# Patient Record
Sex: MALE | Race: WHITE
[De-identification: names, ages, dates, MRNs, and addresses within clinical notes are randomized per-mention and may not be internally consistent; named-entity substitution may affect disease eponyms.]

---

## 2021-12-07 ENCOUNTER — HOSPITAL ENCOUNTER (INPATIENT)
Dept: HOSPITAL 95 - ER | Age: 56
LOS: 4 days | Discharge: HOME | DRG: 177 | End: 2021-12-11
Attending: HOSPITALIST | Admitting: HOSPITALIST
Payer: COMMERCIAL

## 2021-12-07 VITALS — HEIGHT: 76 IN | BODY MASS INDEX: 37.18 KG/M2 | WEIGHT: 305.34 LBS

## 2021-12-07 DIAGNOSIS — E66.9: ICD-10-CM

## 2021-12-07 DIAGNOSIS — I48.91: ICD-10-CM

## 2021-12-07 DIAGNOSIS — J96.01: ICD-10-CM

## 2021-12-07 DIAGNOSIS — N17.9: ICD-10-CM

## 2021-12-07 DIAGNOSIS — U07.1: Primary | ICD-10-CM

## 2021-12-07 DIAGNOSIS — E11.65: ICD-10-CM

## 2021-12-07 DIAGNOSIS — J12.82: ICD-10-CM

## 2021-12-07 DIAGNOSIS — I26.94: ICD-10-CM

## 2021-12-07 DIAGNOSIS — I10: ICD-10-CM

## 2021-12-07 DIAGNOSIS — Z79.899: ICD-10-CM

## 2021-12-07 LAB
PROTHROMBIN TIME: 12.6 SEC (ref 9.7–11.5)
TROPONIN I SERPL-MCNC: 0.02 NG/ML (ref 0–0.04)
TSH SERPL DL<=0.005 MIU/L-ACNC: 0.7 UIU/ML (ref 0.36–4.8)

## 2021-12-07 PROCEDURE — A9270 NON-COVERED ITEM OR SERVICE: HCPCS

## 2021-12-07 PROCEDURE — 3E0333Z INTRODUCTION OF ANTI-INFLAMMATORY INTO PERIPHERAL VEIN, PERCUTANEOUS APPROACH: ICD-10-PCS | Performed by: HOSPITALIST

## 2021-12-07 PROCEDURE — 8E0ZXY6 ISOLATION: ICD-10-PCS | Performed by: HOSPITALIST

## 2021-12-08 LAB
ALBUMIN SERPL BCP-MCNC: 2.5 G/DL (ref 3.4–5)
ALBUMIN/GLOB SERPL: 0.6 {RATIO} (ref 0.8–1.8)
ALT SERPL W P-5'-P-CCNC: 47 U/L (ref 12–78)
ANION GAP SERPL CALCULATED.4IONS-SCNC: 12 MMOL/L (ref 6–16)
AST SERPL W P-5'-P-CCNC: 17 U/L (ref 12–37)
BASOPHILS # BLD AUTO: 0.04 K/MM3 (ref 0–0.23)
BASOPHILS NFR BLD AUTO: 0 % (ref 0–2)
BILIRUB SERPL-MCNC: 1.7 MG/DL (ref 0.1–1)
BUN SERPL-MCNC: 17 MG/DL (ref 8–24)
CALCIUM SERPL-MCNC: 8.6 MG/DL (ref 8.5–10.1)
CHLORIDE SERPL-SCNC: 104 MMOL/L (ref 98–108)
CHOLEST SERPL-MCNC: 123 MG/DL (ref 50–200)
CHOLEST/HDLC SERPL: 4 {RATIO}
CO2 SERPL-SCNC: 21 MMOL/L (ref 21–32)
CREAT SERPL-MCNC: 1.03 MG/DL (ref 0.6–1.2)
DEPRECATED RDW RBC AUTO: 44.2 FL (ref 35.1–46.3)
EOSINOPHIL # BLD AUTO: 0 K/MM3 (ref 0–0.68)
EOSINOPHIL NFR BLD AUTO: 0 % (ref 0–6)
ERYTHROCYTE [DISTWIDTH] IN BLOOD BY AUTOMATED COUNT: 14.9 % (ref 11.7–14.2)
GLOBULIN SER CALC-MCNC: 4 G/DL (ref 2.2–4)
GLUCOSE SERPL-MCNC: 171 MG/DL (ref 70–99)
HCT VFR BLD AUTO: 50.1 % (ref 37–53)
HDLC SERPL-MCNC: 31 MG/DL (ref 39–?)
HGB BLD-MCNC: 16.5 G/DL (ref 13.5–17.5)
IMM GRANULOCYTES # BLD AUTO: 0.17 K/MM3 (ref 0–0.1)
IMM GRANULOCYTES NFR BLD AUTO: 1 % (ref 0–1)
LDLC SERPL CALC-MCNC: 81 MG/DL (ref 0–110)
LDLC/HDLC SERPL: 2.6 {RATIO}
LYMPHOCYTES # BLD AUTO: 1.05 K/MM3 (ref 0.84–5.2)
LYMPHOCYTES NFR BLD AUTO: 6 % (ref 21–46)
MCHC RBC AUTO-ENTMCNC: 32.9 G/DL (ref 31.5–36.5)
MCV RBC AUTO: 85 FL (ref 80–100)
MONOCYTES # BLD AUTO: 1.93 K/MM3 (ref 0.16–1.47)
MONOCYTES NFR BLD AUTO: 12 % (ref 4–13)
NEUTROPHILS # BLD AUTO: 13.49 K/MM3 (ref 1.96–9.15)
NEUTROPHILS NFR BLD AUTO: 81 % (ref 41–73)
NRBC # BLD AUTO: 0 K/MM3 (ref 0–0.02)
NRBC BLD AUTO-RTO: 0 /100 WBC (ref 0–0.2)
PLATELET # BLD AUTO: 376 K/MM3 (ref 150–400)
POTASSIUM SERPL-SCNC: 4.6 MMOL/L (ref 3.5–5.5)
PROT SERPL-MCNC: 6.5 G/DL (ref 6.4–8.2)
SODIUM SERPL-SCNC: 137 MMOL/L (ref 136–145)
TRIGL SERPL-MCNC: 56 MG/DL (ref 30–160)
VLDLC SERPL CALC-MCNC: 11 MG/DL (ref 6–32)

## 2021-12-08 NOTE — NUR
SHIFT SUMMARY
 
ER TRANSFER. ASSUMED CARE AT 0500. PT ALERT AND ORIENTED X4. ON RA MAINTAINING
SATS OVER 90%. AFIB 100, 110'S. CARDIZEM AND HEPARIN GTT INFUSING. +2 PITTING
LOWER EXTREMITY EDEMA. C/O CHEST TIGHTNESS. SOB W/ ACTIVITY. IN BED RESTING
WITH CALL ALARM AT SIDE

## 2021-12-08 NOTE — NUR
END OF SHIFT SUMMARY:
     FULL CODE, COVID, PE. PATIENT HAS A HISTORY OF HTN, BP'S HAVE BEEN WNL,
PATIENT DOES HAVE CHEST TIGHTNESS, HOWEVER, HAS NOT INCREASED, AND HAS BEEN
CONSISTENT. TROP -, LUNGS ARE DIM ON THE RLL BUT CLEAR AND COARSE ON THE REST.
PATINET HAS BEEN AFIB IN THE 'S CARDIZEM DRIP AT 5, HEPARIN DRIP AT 22,
47.5 ML/HR. INDEPENT IN THE ROOM O2 AT 2LPM SPO2 >93%. APPETITE APPROPRIATE.
ACTIVE IN REPOSITIONING THROUGHOUT THE DAY. EDEMA IN THE LOWER EXTREMS BILAT
+1-+2 DEPENDING ON HOW LONG SITTING, PEDAL PULSES FAINT, BUT PRESENT AND WARM.
HAS HAD ANXIETY PREVIOUSLY BUT WITH 2LPM NC O2, SUBSIDED.

## 2021-12-09 LAB
ANION GAP SERPL CALCULATED.4IONS-SCNC: 10 MMOL/L (ref 6–16)
BASOPHILS # BLD AUTO: 0.04 K/MM3 (ref 0–0.23)
BASOPHILS NFR BLD AUTO: 0 % (ref 0–2)
BUN SERPL-MCNC: 26 MG/DL (ref 8–24)
CALCIUM SERPL-MCNC: 8.9 MG/DL (ref 8.5–10.1)
CHLORIDE SERPL-SCNC: 104 MMOL/L (ref 98–108)
CO2 SERPL-SCNC: 21 MMOL/L (ref 21–32)
CREAT SERPL-MCNC: 1.06 MG/DL (ref 0.6–1.2)
DEPRECATED RDW RBC AUTO: 45.2 FL (ref 35.1–46.3)
EOSINOPHIL # BLD AUTO: 0 K/MM3 (ref 0–0.68)
EOSINOPHIL NFR BLD AUTO: 0 % (ref 0–6)
ERYTHROCYTE [DISTWIDTH] IN BLOOD BY AUTOMATED COUNT: 14.6 % (ref 11.7–14.2)
GLUCOSE SERPL-MCNC: 178 MG/DL (ref 70–99)
HCT VFR BLD AUTO: 49.8 % (ref 37–53)
HGB BLD-MCNC: 16.5 G/DL (ref 13.5–17.5)
IMM GRANULOCYTES # BLD AUTO: 0.16 K/MM3 (ref 0–0.1)
IMM GRANULOCYTES NFR BLD AUTO: 1 % (ref 0–1)
LYMPHOCYTES # BLD AUTO: 0.71 K/MM3 (ref 0.84–5.2)
LYMPHOCYTES NFR BLD AUTO: 5 % (ref 21–46)
MCHC RBC AUTO-ENTMCNC: 33.1 G/DL (ref 31.5–36.5)
MCV RBC AUTO: 85 FL (ref 80–100)
MONOCYTES # BLD AUTO: 1.62 K/MM3 (ref 0.16–1.47)
MONOCYTES NFR BLD AUTO: 11 % (ref 4–13)
NEUTROPHILS # BLD AUTO: 11.97 K/MM3 (ref 1.96–9.15)
NEUTROPHILS NFR BLD AUTO: 83 % (ref 41–73)
NRBC # BLD AUTO: 0 K/MM3 (ref 0–0.02)
NRBC BLD AUTO-RTO: 0 /100 WBC (ref 0–0.2)
PLATELET # BLD AUTO: 329 K/MM3 (ref 150–400)
POTASSIUM SERPL-SCNC: 5.2 MMOL/L (ref 3.5–5.5)
SODIUM SERPL-SCNC: 135 MMOL/L (ref 136–145)

## 2021-12-09 NOTE — NUR
Pt is A&Ox4, pleasant with cares. VSS on RA-2L. Pt has mostly been on RA
today. Tele: a-fib 90-110s, cardizem gtt continued at 5mg/hr and PO cardizem
was started. Isolation for COVID precautions were d/c today due to 21 days in
iso. Heparin gtt is still running and will be d/c 2100 once the first dose of
eliquis is given. pt is independent in room.

## 2021-12-09 NOTE — NUR
SHIFT SUMMARY
PT HAS BEEN UNABLE TO REST MUCH THROUGH THE NIGHT. HE IS CONSTANTLY SWITCHING
PLACES IN THE ROOM AND STATES HE CAN'T GET COMFORTABLE, HAS A LOT OF ANXIETY,
AND FEELS RESTLESS. PT EDUCATION ON PRONE POSITIONING & DB&C. ONE TIME DOSE OF
0.5 MG ATIVAN GIVEN, TYLENOL GIVEN FOR CHEST RELATED TO EXCESSIVE COUGHING,
HEP GTT INFUSING PER EMAR. CARDIZEM GTT REMIANS @ 5MG/HR, AFIB 90'S-100'S.
DENIES PAIN, ONLY STATES CHEST TIGHTNESS RELATED TO COUGHING AND CONGESTION.
pT INDEPENDENT IN THE ROOM. CALL LIGHT IN REACH, BED IN LOW POSITION.

## 2021-12-10 LAB
ALBUMIN SERPL BCP-MCNC: 2.5 G/DL (ref 3.4–5)
ANION GAP SERPL CALCULATED.4IONS-SCNC: 8 MMOL/L (ref 6–16)
BASOPHILS # BLD AUTO: 0.03 K/MM3 (ref 0–0.23)
BASOPHILS NFR BLD AUTO: 0 % (ref 0–2)
BUN SERPL-MCNC: 27 MG/DL (ref 8–24)
CALCIUM SERPL-MCNC: 9.1 MG/DL (ref 8.5–10.1)
CHLORIDE SERPL-SCNC: 101 MMOL/L (ref 98–108)
CO2 SERPL-SCNC: 26 MMOL/L (ref 21–32)
CREAT SERPL-MCNC: 1.27 MG/DL (ref 0.6–1.2)
DEPRECATED RDW RBC AUTO: 46 FL (ref 35.1–46.3)
EOSINOPHIL # BLD AUTO: 0.01 K/MM3 (ref 0–0.68)
EOSINOPHIL NFR BLD AUTO: 0 % (ref 0–6)
ERYTHROCYTE [DISTWIDTH] IN BLOOD BY AUTOMATED COUNT: 14.8 % (ref 11.7–14.2)
GLUCOSE SERPL-MCNC: 160 MG/DL (ref 70–99)
HCT VFR BLD AUTO: 48.8 % (ref 37–53)
HGB BLD-MCNC: 15.8 G/DL (ref 13.5–17.5)
IMM GRANULOCYTES # BLD AUTO: 0.17 K/MM3 (ref 0–0.1)
IMM GRANULOCYTES NFR BLD AUTO: 1 % (ref 0–1)
LYMPHOCYTES # BLD AUTO: 1 K/MM3 (ref 0.84–5.2)
LYMPHOCYTES NFR BLD AUTO: 7 % (ref 21–46)
MCHC RBC AUTO-ENTMCNC: 32.4 G/DL (ref 31.5–36.5)
MCV RBC AUTO: 86 FL (ref 80–100)
MONOCYTES # BLD AUTO: 1.51 K/MM3 (ref 0.16–1.47)
MONOCYTES NFR BLD AUTO: 10 % (ref 4–13)
NEUTROPHILS # BLD AUTO: 11.84 K/MM3 (ref 1.96–9.15)
NEUTROPHILS NFR BLD AUTO: 81 % (ref 41–73)
NRBC # BLD AUTO: 0 K/MM3 (ref 0–0.02)
NRBC BLD AUTO-RTO: 0 /100 WBC (ref 0–0.2)
PHOSPHATE SERPL-MCNC: 3.7 MG/DL (ref 2.5–4.9)
PLATELET # BLD AUTO: 309 K/MM3 (ref 150–400)
POTASSIUM SERPL-SCNC: 4.5 MMOL/L (ref 3.5–5.5)
SODIUM SERPL-SCNC: 135 MMOL/L (ref 136–145)

## 2021-12-10 PROCEDURE — XW033E5 INTRODUCTION OF REMDESIVIR ANTI-INFECTIVE INTO PERIPHERAL VEIN, PERCUTANEOUS APPROACH, NEW TECHNOLOGY GROUP 5: ICD-10-PCS | Performed by: HOSPITALIST

## 2021-12-10 NOTE — NUR
PATIENT IS ALERT AND ORIENTATED USES CALL LIGHT APPROPRIATELY, ABLE TO MOVE
FREELY IN ROOM INDEPENDENTLY WITH BATHROOM PRIVILAGES, PATIENT RECEIVED LASIX
IV TONIGHT AND HAS REPORTED HAVING TO URINATE ABOUT EVERY 20 MINUTES, LUNG
SOUNDS ARE COARSE THROUGHOUT ALL FIELDS, USES INCENTIVE SPIROMETER THROUGHOUT
SHIFT, NO NOTED COUGH REPORTS WHEN HE DOES COUGH THAT HE HAS THINNER
SECRETIONS WITH SLIGHT TINGE OF PINK. HE IS AFIB ON TELEMETRY WITH RATES
MAINTAINING IN THE 80'S NO COMPLAINTS OF PAIN, ON RA HASN'T USED HIS OXYGEN
THIS EVENING, WILL CONTINUE TO MONITOR AND WILL CHART ANY KNOWN CHANGES.

## 2021-12-10 NOTE — NUR
PATIENT HAD A DBP , PATIENT REPORTED ANXIOUS AND HAVING DIFFICULTY
SLEEPING RECEIVED ATIVAN 1MG PO. SNACK PROVIDED PER REQUEST AND WILL
REEVALUATE BLOOD PRESSURE.

## 2021-12-10 NOTE — NUR
SHIFT SUMMARY
PTR A/O X4 AND COOPERATIVE OF CARE. VSS T/O SHIFT WITH O2 SATS >94% ON RA. PT
INDEPENDENT IN ROOM. NO C/O CHEST PAIN/PRESSURE T/O SHIFT. NO REPORT OF SOB
T/O SHIFT. PT REPORTS "I GOT THE BEST SLEEP IN A MONTH LAST NIGHT." PT ASKED
ABOUT DISCHARGE, DOCTOR INFORMED PT THAT AN 02 EVAL WHILE AMBULATING WILL NEED
TO BE DONE TOMORROW, PT UNDERSTOOD. PT REORTS BEING ABLE TO BREATH EASIER
DURING ASSESSMENT.

## 2021-12-11 LAB
ALBUMIN SERPL BCP-MCNC: 2.3 G/DL (ref 3.4–5)
ANION GAP SERPL CALCULATED.4IONS-SCNC: 7 MMOL/L (ref 6–16)
BASOPHILS # BLD AUTO: 0.03 K/MM3 (ref 0–0.23)
BASOPHILS NFR BLD AUTO: 0 % (ref 0–2)
BUN SERPL-MCNC: 29 MG/DL (ref 8–24)
CALCIUM SERPL-MCNC: 9 MG/DL (ref 8.5–10.1)
CHLORIDE SERPL-SCNC: 102 MMOL/L (ref 98–108)
CO2 SERPL-SCNC: 27 MMOL/L (ref 21–32)
CREAT SERPL-MCNC: 1.35 MG/DL (ref 0.6–1.2)
DEPRECATED RDW RBC AUTO: 46 FL (ref 35.1–46.3)
EOSINOPHIL # BLD AUTO: 0 K/MM3 (ref 0–0.68)
EOSINOPHIL NFR BLD AUTO: 0 % (ref 0–6)
ERYTHROCYTE [DISTWIDTH] IN BLOOD BY AUTOMATED COUNT: 14.6 % (ref 11.7–14.2)
GLUCOSE SERPL-MCNC: 204 MG/DL (ref 70–99)
HCT VFR BLD AUTO: 49.1 % (ref 37–53)
HGB BLD-MCNC: 15.8 G/DL (ref 13.5–17.5)
IMM GRANULOCYTES # BLD AUTO: 0.21 K/MM3 (ref 0–0.1)
IMM GRANULOCYTES NFR BLD AUTO: 2 % (ref 0–1)
LYMPHOCYTES # BLD AUTO: 0.76 K/MM3 (ref 0.84–5.2)
LYMPHOCYTES NFR BLD AUTO: 6 % (ref 21–46)
MAGNESIUM SERPL-MCNC: 2 MG/DL (ref 1.6–2.4)
MCHC RBC AUTO-ENTMCNC: 32.2 G/DL (ref 31.5–36.5)
MCV RBC AUTO: 87 FL (ref 80–100)
MONOCYTES # BLD AUTO: 1.29 K/MM3 (ref 0.16–1.47)
MONOCYTES NFR BLD AUTO: 11 % (ref 4–13)
NEUTROPHILS # BLD AUTO: 9.94 K/MM3 (ref 1.96–9.15)
NEUTROPHILS NFR BLD AUTO: 81 % (ref 41–73)
NRBC # BLD AUTO: 0 K/MM3 (ref 0–0.02)
NRBC BLD AUTO-RTO: 0 /100 WBC (ref 0–0.2)
PHOSPHATE SERPL-MCNC: 3.9 MG/DL (ref 2.5–4.9)
PLATELET # BLD AUTO: 281 K/MM3 (ref 150–400)
POTASSIUM SERPL-SCNC: 4.5 MMOL/L (ref 3.5–5.5)
SODIUM SERPL-SCNC: 136 MMOL/L (ref 136–145)

## 2021-12-11 NOTE — NUR
PT DISCHARGE
PT PROVIDED WITH DISCHARGE INSTRUCTIOSN PER [facility]. PT EDUCATED ON NEW
DIABETIC DX. INSTRUCTED ON HOW TO USE GLUCOMETER. USED TEACH BACK METHOD.PT
HAS HARD COPY RX FOR GLUCOMETER. MEDICATIONS FAXED TO PT'S PREFERRED PHARMACY.
IV'S REMOVED. TELE REMOVED. PT LEFT BY SELF TO PRIVATE VEHICLE.

## 2021-12-28 ENCOUNTER — HOSPITAL ENCOUNTER (INPATIENT)
Dept: HOSPITAL 95 - ER | Age: 56
LOS: 32 days | DRG: 4 | End: 2022-01-29
Attending: HOSPITALIST | Admitting: HOSPITALIST
Payer: COMMERCIAL

## 2021-12-28 VITALS — WEIGHT: 272.27 LBS | HEIGHT: 76 IN | BODY MASS INDEX: 33.16 KG/M2

## 2021-12-28 DIAGNOSIS — A41.89: Primary | ICD-10-CM

## 2021-12-28 DIAGNOSIS — I48.92: ICD-10-CM

## 2021-12-28 DIAGNOSIS — E66.9: ICD-10-CM

## 2021-12-28 DIAGNOSIS — E83.42: ICD-10-CM

## 2021-12-28 DIAGNOSIS — L89.153: ICD-10-CM

## 2021-12-28 DIAGNOSIS — R57.0: ICD-10-CM

## 2021-12-28 DIAGNOSIS — E87.5: ICD-10-CM

## 2021-12-28 DIAGNOSIS — I48.91: ICD-10-CM

## 2021-12-28 DIAGNOSIS — G92.8: ICD-10-CM

## 2021-12-28 DIAGNOSIS — J96.01: ICD-10-CM

## 2021-12-28 DIAGNOSIS — E11.22: ICD-10-CM

## 2021-12-28 DIAGNOSIS — D64.9: ICD-10-CM

## 2021-12-28 DIAGNOSIS — J12.82: ICD-10-CM

## 2021-12-28 DIAGNOSIS — E87.2: ICD-10-CM

## 2021-12-28 DIAGNOSIS — Z51.5: ICD-10-CM

## 2021-12-28 DIAGNOSIS — K31.82: ICD-10-CM

## 2021-12-28 DIAGNOSIS — K74.60: ICD-10-CM

## 2021-12-28 DIAGNOSIS — R65.21: ICD-10-CM

## 2021-12-28 DIAGNOSIS — E83.39: ICD-10-CM

## 2021-12-28 DIAGNOSIS — Z66: ICD-10-CM

## 2021-12-28 DIAGNOSIS — Z79.899: ICD-10-CM

## 2021-12-28 DIAGNOSIS — N17.9: ICD-10-CM

## 2021-12-28 DIAGNOSIS — E11.65: ICD-10-CM

## 2021-12-28 DIAGNOSIS — E80.6: ICD-10-CM

## 2021-12-28 DIAGNOSIS — K72.01: ICD-10-CM

## 2021-12-28 DIAGNOSIS — N18.6: ICD-10-CM

## 2021-12-28 DIAGNOSIS — Z79.84: ICD-10-CM

## 2021-12-28 DIAGNOSIS — I10: ICD-10-CM

## 2021-12-28 DIAGNOSIS — U07.1: ICD-10-CM

## 2021-12-28 DIAGNOSIS — E11.43: ICD-10-CM

## 2021-12-28 DIAGNOSIS — E87.1: ICD-10-CM

## 2021-12-28 DIAGNOSIS — I42.0: ICD-10-CM

## 2021-12-28 DIAGNOSIS — R04.0: ICD-10-CM

## 2021-12-28 DIAGNOSIS — N15.1: ICD-10-CM

## 2021-12-28 DIAGNOSIS — E88.09: ICD-10-CM

## 2021-12-28 DIAGNOSIS — E83.51: ICD-10-CM

## 2021-12-28 DIAGNOSIS — K59.00: ICD-10-CM

## 2021-12-28 DIAGNOSIS — K31.84: ICD-10-CM

## 2021-12-28 DIAGNOSIS — J15.9: ICD-10-CM

## 2021-12-28 DIAGNOSIS — E11.42: ICD-10-CM

## 2021-12-28 LAB
ALBUMIN SERPL BCP-MCNC: 2 G/DL (ref 3.4–5)
ALBUMIN/GLOB SERPL: 0.5 {RATIO} (ref 0.8–1.8)
ALT SERPL W P-5'-P-CCNC: 946 U/L (ref 12–78)
ANION GAP SERPL CALCULATED.4IONS-SCNC: 15 MMOL/L (ref 6–16)
ANION GAP SERPL CALCULATED.4IONS-SCNC: 23 MMOL/L (ref 6–16)
ANION GAP SERPL CALCULATED.4IONS-SCNC: 29 MMOL/L (ref 6–16)
AST SERPL W P-5'-P-CCNC: 1069 U/L (ref 12–37)
BASOPHILS # BLD AUTO: 0.05 K/MM3 (ref 0–0.23)
BASOPHILS # BLD AUTO: 0.08 K/MM3 (ref 0–0.23)
BASOPHILS NFR BLD AUTO: 0 % (ref 0–2)
BASOPHILS NFR BLD AUTO: 1 % (ref 0–2)
BILIRUB SERPL-MCNC: 3.6 MG/DL (ref 0.1–1)
BILIRUB UR QL STRIP: (no result)
BUN SERPL-MCNC: 34 MG/DL (ref 8–24)
BUN SERPL-MCNC: 37 MG/DL (ref 8–24)
BUN SERPL-MCNC: 43 MG/DL (ref 8–24)
CALCIUM SERPL-MCNC: 6.6 MG/DL (ref 8.5–10.1)
CALCIUM SERPL-MCNC: 8.7 MG/DL (ref 8.5–10.1)
CHLORIDE SERPL-SCNC: 104 MMOL/L (ref 98–108)
CHLORIDE SERPL-SCNC: 96 MMOL/L (ref 98–108)
CHLORIDE SERPL-SCNC: 97 MMOL/L (ref 98–108)
CO2 SERPL-SCNC: 17 MMOL/L (ref 21–32)
CO2 SERPL-SCNC: 7 MMOL/L (ref 21–32)
CO2 SERPL-SCNC: 7 MMOL/L (ref 21–32)
CREAT SERPL-MCNC: 1.68 MG/DL (ref 0.6–1.2)
CREAT SERPL-MCNC: 1.96 MG/DL (ref 0.6–1.2)
CREAT SERPL-MCNC: 2.9 MG/DL (ref 0.6–1.2)
DEPRECATED RDW RBC AUTO: 47.1 FL (ref 35.1–46.3)
DEPRECATED RDW RBC AUTO: 49.3 FL (ref 35.1–46.3)
EOSINOPHIL # BLD AUTO: 0.01 K/MM3 (ref 0–0.68)
EOSINOPHIL # BLD AUTO: 0.02 K/MM3 (ref 0–0.68)
EOSINOPHIL NFR BLD AUTO: 0 % (ref 0–6)
EOSINOPHIL NFR BLD AUTO: 0 % (ref 0–6)
ERYTHROCYTE [DISTWIDTH] IN BLOOD BY AUTOMATED COUNT: 15.1 % (ref 11.7–14.2)
ERYTHROCYTE [DISTWIDTH] IN BLOOD BY AUTOMATED COUNT: 15.2 % (ref 11.7–14.2)
FLUAV RNA SPEC QL NAA+PROBE: NEGATIVE
FLUBV RNA SPEC QL NAA+PROBE: NEGATIVE
GLOBULIN SER CALC-MCNC: 3.7 G/DL (ref 2.2–4)
GLUCOSE SERPL-MCNC: 104 MG/DL (ref 70–99)
GLUCOSE SERPL-MCNC: 114 MG/DL (ref 70–99)
GLUCOSE SERPL-MCNC: 155 MG/DL (ref 70–99)
HCT VFR BLD AUTO: 41.6 % (ref 37–53)
HCT VFR BLD AUTO: 46 % (ref 37–53)
HGB BLD-MCNC: 13 G/DL (ref 13.5–17.5)
HGB BLD-MCNC: 15.1 G/DL (ref 13.5–17.5)
IMM GRANULOCYTES # BLD AUTO: 0.18 K/MM3 (ref 0–0.1)
IMM GRANULOCYTES # BLD AUTO: 0.63 K/MM3 (ref 0–0.1)
IMM GRANULOCYTES NFR BLD AUTO: 1 % (ref 0–1)
IMM GRANULOCYTES NFR BLD AUTO: 5 % (ref 0–1)
KETONES UR STRIP-MCNC: (no result) MG/DL
LEUKOCYTE ESTERASE UR QL STRIP: (no result)
LYMPHOCYTES # BLD AUTO: 0.73 K/MM3 (ref 0.84–5.2)
LYMPHOCYTES # BLD AUTO: 0.98 K/MM3 (ref 0.84–5.2)
LYMPHOCYTES NFR BLD AUTO: 5 % (ref 21–46)
LYMPHOCYTES NFR BLD AUTO: 7 % (ref 21–46)
MAGNESIUM SERPL-MCNC: 1.4 MG/DL (ref 1.6–2.4)
MCHC RBC AUTO-ENTMCNC: 31.3 G/DL (ref 31.5–36.5)
MCHC RBC AUTO-ENTMCNC: 32.8 G/DL (ref 31.5–36.5)
MCV RBC AUTO: 85 FL (ref 80–100)
MCV RBC AUTO: 90 FL (ref 80–100)
MONOCYTES # BLD AUTO: 1.17 K/MM3 (ref 0.16–1.47)
MONOCYTES # BLD AUTO: 1.26 K/MM3 (ref 0.16–1.47)
MONOCYTES NFR BLD AUTO: 9 % (ref 4–13)
MONOCYTES NFR BLD AUTO: 9 % (ref 4–13)
NEUTROPHILS # BLD AUTO: 10.32 K/MM3 (ref 1.96–9.15)
NEUTROPHILS # BLD AUTO: 12.24 K/MM3 (ref 1.96–9.15)
NEUTROPHILS NFR BLD AUTO: 78 % (ref 41–73)
NEUTROPHILS NFR BLD AUTO: 85 % (ref 41–73)
NRBC # BLD AUTO: 0 K/MM3 (ref 0–0.02)
NRBC # BLD AUTO: 0.02 K/MM3 (ref 0–0.02)
NRBC BLD AUTO-RTO: 0 /100 WBC (ref 0–0.2)
NRBC BLD AUTO-RTO: 0.2 /100 WBC (ref 0–0.2)
PH BLDA: 7.07 [PH] (ref 7.35–7.45)
PH BLDA: 7.11 [PH] (ref 7.35–7.45)
PH BLDA: 7.12 [PH] (ref 7.35–7.45)
PH BLDA: 7.47 [PH] (ref 7.35–7.45)
PLATELET # BLD AUTO: 206 K/MM3 (ref 150–400)
PLATELET # BLD AUTO: 254 K/MM3 (ref 150–400)
POTASSIUM SERPL-SCNC: 5 MMOL/L (ref 3.5–5.5)
POTASSIUM SERPL-SCNC: 5.6 MMOL/L (ref 3.5–5.5)
POTASSIUM SERPL-SCNC: 7 MMOL/L (ref 3.5–5.5)
PROT SERPL-MCNC: 5.7 G/DL (ref 6.4–8.2)
PROT UR STRIP-MCNC: (no result) MG/DL
RSV RNA SPEC QL NAA+PROBE: NEGATIVE
SARS-COV-2 RNA RESP QL NAA+PROBE: POSITIVE
SODIUM SERPL-SCNC: 129 MMOL/L (ref 136–145)
SODIUM SERPL-SCNC: 132 MMOL/L (ref 136–145)
SODIUM SERPL-SCNC: 134 MMOL/L (ref 136–145)
SP GR SPEC: 1.03 (ref 1–1.02)
TROPONIN I SERPL-MCNC: 0.03 NG/ML (ref 0–0.04)
TROPONIN I SERPL-MCNC: 0.07 NG/ML (ref 0–0.04)
UROBILINOGEN UR STRIP-MCNC: (no result) MG/DL
WBC #/AREA URNS HPF: (no result) /HPF (ref 0–5)

## 2021-12-28 PROCEDURE — 3E0333Z INTRODUCTION OF ANTI-INFLAMMATORY INTO PERIPHERAL VEIN, PERCUTANEOUS APPROACH: ICD-10-PCS | Performed by: HOSPITALIST

## 2021-12-28 PROCEDURE — 5A09357 ASSISTANCE WITH RESPIRATORY VENTILATION, LESS THAN 24 CONSECUTIVE HOURS, CONTINUOUS POSITIVE AIRWAY PRESSURE: ICD-10-PCS | Performed by: HOSPITALIST

## 2021-12-28 PROCEDURE — 03HY32Z INSERTION OF MONITORING DEVICE INTO UPPER ARTERY, PERCUTANEOUS APPROACH: ICD-10-PCS | Performed by: INTERNAL MEDICINE

## 2021-12-28 PROCEDURE — P9046 ALBUMIN (HUMAN), 25%, 20 ML: HCPCS

## 2021-12-28 PROCEDURE — 0BH18EZ INSERTION OF ENDOTRACHEAL AIRWAY INTO TRACHEA, VIA NATURAL OR ARTIFICIAL OPENING ENDOSCOPIC: ICD-10-PCS | Performed by: INTERNAL MEDICINE

## 2021-12-28 PROCEDURE — 3E033XZ INTRODUCTION OF VASOPRESSOR INTO PERIPHERAL VEIN, PERCUTANEOUS APPROACH: ICD-10-PCS | Performed by: HOSPITALIST

## 2021-12-28 PROCEDURE — C1751 CATH, INF, PER/CENT/MIDLINE: HCPCS

## 2021-12-28 PROCEDURE — C1752 CATH,HEMODIALYSIS,SHORT-TERM: HCPCS

## 2021-12-28 PROCEDURE — P9059 PLASMA, FRZ BETWEEN 8-24HOUR: HCPCS

## 2021-12-28 PROCEDURE — 4A133B1 MONITORING OF ARTERIAL PRESSURE, PERIPHERAL, PERCUTANEOUS APPROACH: ICD-10-PCS | Performed by: INTERNAL MEDICINE

## 2021-12-28 PROCEDURE — 06HM33Z INSERTION OF INFUSION DEVICE INTO RIGHT FEMORAL VEIN, PERCUTANEOUS APPROACH: ICD-10-PCS | Performed by: INTERNAL MEDICINE

## 2021-12-28 PROCEDURE — C1750 CATH, HEMODIALYSIS,LONG-TERM: HCPCS

## 2021-12-28 PROCEDURE — C9113 INJ PANTOPRAZOLE SODIUM, VIA: HCPCS

## 2021-12-28 PROCEDURE — 3E03329 INTRODUCTION OF OTHER ANTI-INFECTIVE INTO PERIPHERAL VEIN, PERCUTANEOUS APPROACH: ICD-10-PCS | Performed by: HOSPITALIST

## 2021-12-28 PROCEDURE — 4A133J1 MONITORING OF ARTERIAL PULSE, PERIPHERAL, PERCUTANEOUS APPROACH: ICD-10-PCS | Performed by: INTERNAL MEDICINE

## 2021-12-28 PROCEDURE — 5A1955Z RESPIRATORY VENTILATION, GREATER THAN 96 CONSECUTIVE HOURS: ICD-10-PCS | Performed by: HOSPITALIST

## 2021-12-28 PROCEDURE — A9270 NON-COVERED ITEM OR SERVICE: HCPCS

## 2021-12-28 NOTE — NUR
Pt arrived to PCU, labored breathing, RR 44/ min on bipap V60 settings 12/10
and 80% fio2.  He is anxious, wanting to take of the bipap and saying, "I
can't take it anymore".  RN from ED and RT at bedside, as well as accepting
PCU RN.  Cyanosis and mottling noted of feet and lower legs, dusky face and
cold, clammy skin.  ICU charge RN Rodriguez called to come see the pt.  Decision
made to transfer pt to ICU.

## 2021-12-28 NOTE — NUR
INTUBATION
 
PATIENT BECAME INCREASINGLY TACHYPNEIC W/ RR IN HIGH 40'S W/ INCREASED WOB AND
DECREASED SPO2 IN 80'S. DURING THE PROCEDURE THE PATIENT WAS SEDATED W/ A
TOTAL  MCG OF PROPOFOL AND BECAME MORE HYPOTENSIVE W/ MAPS IN 40'S
DESPITE DOBUTAMINE AND LEVOPHED GTT. PHENYLEPHRINE  MCG X 3 GIVEN FOR
A TOTAL  MCG GIVEN DURING PROCEDURE ALONG WITH 500ML NS BOLUS. PATIENT
SUBSEQUENTLY STARTED ON VASOPRESSIN AND PHENYLEPHRINE GTTS. SEE OPERATIVE
NOTES FOR FURTHER INFORMATION ON INTUBATION.

## 2021-12-28 NOTE — NUR
ASSUMED CARE/ARRIVED TO ICU
 
PATIENT ARRIVED TO ICU ON BIPAP W/ LABORED BREATHING AND SPO2 IN LOW 80'S
DESPITE 100% FIO2. PATIENT SPEAKS 2-3 WORDS BETWEEN BREATHS AND RR 40'S.
PATIENT IS HYPOTENSIVE W/ MAPS IN LOW 50'S. SKIN IS OVERALL PALE AND
DIAPHORETIC/CLAMMY. PULSES IN SAKINA RADIAL AND SAKINA DP/PT BY DOPPLER ONLY.
PATIENT IS INCREASINGLY CONFUSED AND UNABLE TO ANSWER QUESTIONS APPROPRIATELY.
PULLS AT LINES AND MOVES FREQUENTLY IN BED. TEMP KRAFT PLACED AT BEDSIDE
DURING ASSESSMENT. BEDSIDE REPORT COMPLETED W/ PCU RN.

## 2021-12-29 LAB
ALBUMIN SERPL BCP-MCNC: 1.9 G/DL (ref 3.4–5)
ALBUMIN/GLOB SERPL: 0.5 {RATIO} (ref 0.8–1.8)
ANION GAP SERPL CALCULATED.4IONS-SCNC: 17 MMOL/L (ref 6–16)
ANION GAP SERPL CALCULATED.4IONS-SCNC: 20 MMOL/L (ref 6–16)
ANION GAP SERPL CALCULATED.4IONS-SCNC: 24 MMOL/L (ref 6–16)
AST SERPL W P-5'-P-CCNC: 7228 U/L (ref 12–37)
BASOPHILS # BLD: 0 K/MM3 (ref 0–0.23)
BASOPHILS NFR BLD: 0 % (ref 0–2)
BILIRUB SERPL-MCNC: 4.7 MG/DL (ref 0.1–1)
BUN SERPL-MCNC: 46 MG/DL (ref 8–24)
BUN SERPL-MCNC: 49 MG/DL (ref 8–24)
BUN SERPL-MCNC: 52 MG/DL (ref 8–24)
CALCIUM SERPL-MCNC: 6.6 MG/DL (ref 8.5–10.1)
CALCIUM SERPL-MCNC: 7.2 MG/DL (ref 8.5–10.1)
CALCIUM SERPL-MCNC: 8.4 MG/DL (ref 8.5–10.1)
CALCIUM SERPL-MCNC: 8.8 MG/DL (ref 8.5–10.1)
CHLORIDE SERPL-SCNC: 91 MMOL/L (ref 98–108)
CHLORIDE SERPL-SCNC: 92 MMOL/L (ref 98–108)
CHLORIDE SERPL-SCNC: 94 MMOL/L (ref 98–108)
CO2 SERPL-SCNC: 10 MMOL/L (ref 21–32)
CO2 SERPL-SCNC: 15 MMOL/L (ref 21–32)
CO2 SERPL-SCNC: 16 MMOL/L (ref 21–32)
CREAT SERPL-MCNC: 3.29 MG/DL (ref 0.6–1.2)
CREAT SERPL-MCNC: 3.87 MG/DL (ref 0.6–1.2)
CREAT SERPL-MCNC: 4.23 MG/DL (ref 0.6–1.2)
DEPRECATED RDW RBC AUTO: 50.4 FL (ref 35.1–46.3)
EOSINOPHIL # BLD: 0 K/MM3 (ref 0–0.68)
EOSINOPHIL NFR BLD: 0 % (ref 0–6)
ERYTHROCYTE [DISTWIDTH] IN BLOOD BY AUTOMATED COUNT: 15.3 % (ref 11.7–14.2)
GLOBULIN SER CALC-MCNC: 3.5 G/DL (ref 2.2–4)
GLUCOSE SERPL-MCNC: 281 MG/DL (ref 70–99)
GLUCOSE SERPL-MCNC: 365 MG/DL (ref 70–99)
GLUCOSE SERPL-MCNC: 423 MG/DL (ref 70–99)
HCT VFR BLD AUTO: 41.9 % (ref 37–53)
HGB BLD-MCNC: 13.1 G/DL (ref 13.5–17.5)
LYMPHOCYTES # BLD: 0.63 K/MM3 (ref 0.84–5.2)
LYMPHOCYTES NFR BLD: 4 % (ref 21–46)
MAGNESIUM SERPL-MCNC: 2.1 MG/DL (ref 1.6–2.4)
MCHC RBC AUTO-ENTMCNC: 31.3 G/DL (ref 31.5–36.5)
MCV RBC AUTO: 90 FL (ref 80–100)
MONOCYTES # BLD: 1.42 K/MM3 (ref 0.16–1.47)
MONOCYTES NFR BLD: 9 % (ref 4–13)
MYELOCYTES # BLD MANUAL: 0.15 K/MM3 (ref 0–0)
MYELOCYTES NFR BLD MANUAL: 1 % (ref 0–0)
NEUTS BAND NFR BLD MANUAL: 12 % (ref 0–8)
NEUTS SEG # BLD MANUAL: 13.61 K/MM3 (ref 1.96–9.15)
NEUTS SEG NFR BLD MANUAL: 74 % (ref 41–73)
NRBC # BLD AUTO: 0.03 K/MM3 (ref 0–0.02)
NRBC BLD AUTO-RTO: 0.2 /100 WBC (ref 0–0.2)
PH BLDA: 7.21 [PH] (ref 7.35–7.45)
PH BLDA: 7.36 [PH] (ref 7.35–7.45)
PH BLDA: 7.47 [PH] (ref 7.35–7.45)
PHOSPHATE SERPL-MCNC: 8.5 MG/DL (ref 2.5–4.9)
PLATELET # BLD AUTO: 191 K/MM3 (ref 150–400)
POTASSIUM SERPL-SCNC: 5 MMOL/L (ref 3.5–5.5)
POTASSIUM SERPL-SCNC: 5.7 MMOL/L (ref 3.5–5.5)
POTASSIUM SERPL-SCNC: 6.9 MMOL/L (ref 3.5–5.5)
PROT SERPL-MCNC: 5.4 G/DL (ref 6.4–8.2)
PROTHROMBIN TIME: 30.2 SEC (ref 9.7–11.5)
SODIUM SERPL-SCNC: 125 MMOL/L (ref 136–145)
SODIUM SERPL-SCNC: 126 MMOL/L (ref 136–145)
SODIUM SERPL-SCNC: 128 MMOL/L (ref 136–145)
TOTAL CELLS COUNTED BLD: 100
TROPONIN I SERPL-MCNC: 0.13 NG/ML (ref 0–0.04)

## 2021-12-29 NOTE — NUR
ASSUMED CARE @0700
PATIENT INTUBATED AND SEDATED ON PROPOFOL, PRECEDEX AND ATIVAN PRN. PATIENT
MOVING IN BED COUGHING AT START OF SHIFT, MEDICATED WITH ATIVAN. RESPONDS TO
PAINFUL STIMULI. 02 SATS 95% ON VENT AC PC 30/8/50%, RR 30. LUNGS CLEAR TO
DIMINISHED IN THE BASES. SCANT AMOUNT OF BLOODY SPUTUM IN SUCTION TUBE. HR A.
FIB @90s-130s. BP STABLE WITH MULTIPLE PRESSORS LEVO, DOPAMINE, DOBUTAMINE,
VASO, AND FAISAL. STARTED EPI AND ABLE TO TITRATE DOPAMINE, DOBUTAMINE, AND FAISAL
OFF. ART LINE IN PLACE. TOES PURPLE/BLUE, CAP REFILL >3 SECONDS. DOPPLER
PULSES ON BLE. OG TUBE TO LIS, SMALL AMOUNT OF BROWN BILE. BOWEL TONES ABSENT.
KRAFT WITH SMALL AMOUNT OF DEANA OUTPUTT ONE TIME ORDER FOR LASIX, SODIUM
BICARB STOPPED PER DR. WAYNE. , INSULIN GTT ORDERED, WILL START. TURNED
Q2 HOURS. SPOKE WITH DAUGHTER LUIS AND UPDATED ON PT CONDITION, SHE WILL
UPDATE FAMILY. SEE SHIFT ASSESSMENT FOR MORE DETAIL.

## 2021-12-29 NOTE — NUR
SOME SEDIMENT NOTICED IN KRAFT CATHETER, IRRIGATED WITH 100 MLS STERILE WATER,
STILL MINIMAL OUTPUT BUT KRAFT PATENT.

## 2021-12-29 NOTE — NUR
VALUABLES
 
PATIENT HAS CELL PHONE, GLASSES AND WALLET IN ROOM. FAMILY DECLINED TO TAKE
BELONGINGS HOME. WALLET HAS CASH INSIDE AMOUNTING TO 88 DOLLARS, COUNTED WITH
REESE GERMAIN. THESE BELONGINGS ARE PLACED IN A ZIPLOCK BAG, THEN PLACED IN WHITE
BELONGINGS BAG WITH CLOTHING. WILL AGAIN ENCOURAGE FAMILY TO TAKE THESE
BELONGINGS HOME.

## 2021-12-29 NOTE — NUR
SHIFT SUMMARY
 
PATIENT CAME TO ICU ON BIPAP WITH 100% FIO2, INTUBATED AFTER ARRIVING TO ICU.
CL AND ART LINE PLACED TO RT FEMORAL. BLOOD PRESSURES REMAINED LOW; STARTED ON
LEVOPHED, DOBUTAMINE, VASOPRESSIN, PHENYLEPHRINE, AND DOPAMINE TO KEEP MAPS 65
OR GREATER; MAPS CURRENTLY HIGH 50'S. PROPOFOL AND PRECEDEX IS INF FOR
SEDATION. SODIUM BICARB INF. SEE FLOW SHEET FOR GTT RATES. PATIENT IS ON AC/PC
PI 25, PEEP 8, RATE 30, FIO2 50%. RR IN 30'S, SPO2 IN LOW 90'S. PATIENT BITES
ON ETT PERIODICALLY AND REQUIRES PRN ATIVAN PUSHES TO PROMOTE VENTILATOR
COMPLIANCE. OGT PLACED AND PLACED TO INTERMITTENT SUCTION. TEMP KRAFT PATENT
AND DRAINED 100ML DEANA CLEAR URINE. TEMP INCREASED FROM 100 .3F.
 
 
FAMILY CAME TO BEDSIDE AND UPDATES WERE GIVEN ON PATIENT CONDITION AND
PROGNOSIS. FAMILY WAS ENCOURAGED TO TAKE HOME PATIENT BELONGINGS, BUT
DECLINED. SEE NOTE "VALUABLES".

## 2021-12-29 NOTE — NUR
ASSUMED CARE
 
PATIENT LYING IN BED INTUBATED AND SEDATED. CURRENTLY ON PC 22/5 30% FIO2 WITH
SPO2 IN MID 90'S. SEDATED W/ PROPOFOL 15MCG/KG/MIN AND PRECEDEX 0.2MCG/KG/HR.
DOES NOT OPEN EYES OR FOLLOW COMMANDS. CENTRAL LINE AND ART LINE TO RT FEMORAL
AND 18G PIV TO LT AC, AND 2 18G PIV TO RT FOREARM. LT AC AND RT FOREARM IV
REMOVED AT BEDSIDE DURING ASSESSMENT. CL DRESSING INTACT W/ CLOTTED BLOOD
UNDERNEATH; DRESSING CHANGED FOLLOWING ASSESSMENT. SEE FLOWSHEET FOR GTT
RATES. NO FAMILY AT BEDSIDE DURING THIS TIME. TEMP KRAFT PATENT AND DRAINING
TO GRAVITY. REPORT COMPLETED W/ DAYSHICYNDEE RN.

## 2021-12-29 NOTE — NUR
SHIFT SUMMARY
PATIENT REMAINS INTUBATED AND SEDATED ON PROPOFOL AND PRECEDEX. PATIENT
RESPONDS TO SUCTIONING AND PAINFUL STIMULI. 02 SATS 97% ON VENT AC PC
25/5/35%, RR 30. LUNGS CLEAR TO DIMINISHED. RT TITRATED VENT SETTINGS AFTER
MOST RECENT ABG.  HR A.FIB IN THE 90s. BP MAP >65 ON LEVO, VASO AND EPI. TOES
REMAIN PURPLE/BLUE, NO CHANGE THIS SHIFT.  DOPPLER PULSES FOR BLE. OG TO LIS,
50 MLS OUTPUT OF BROWN/GREEN BILE. BOWEL TONES REMAIN ABSENT. KRAFT DRAINING
SCANT AMOUNT OF DEANA URINE, 60 MLS OUTPUT THIS ENTIRE SHIFT. DR. WAYNE AWARE,
FAMILY OK WITH DIALYSIS BUT WILL TRY DIURETICS OVERNIGHT. Q1 CBG, INSULIN GTT
INF. Q2 HOUR TURNS. NIECE AND SON IN TO VISIT PATIENT. DR. WAYNE CALLED AND
UPDATED DAUGHTER AROUND 1800.

## 2021-12-30 LAB
ALBUMIN SERPL BCP-MCNC: 1.9 G/DL (ref 3.4–5)
ALBUMIN/GLOB SERPL: 0.5 {RATIO} (ref 0.8–1.8)
ALT SERPL W P-5'-P-CCNC: 7203 U/L (ref 12–78)
ANION GAP SERPL CALCULATED.4IONS-SCNC: 14 MMOL/L (ref 6–16)
AST SERPL W P-5'-P-CCNC: 8727 U/L (ref 12–37)
BASOPHILS # BLD: 0 K/MM3 (ref 0–0.23)
BASOPHILS NFR BLD: 0 % (ref 0–2)
BILIRUB SERPL-MCNC: 6.3 MG/DL (ref 0.1–1)
BUN SERPL-MCNC: 60 MG/DL (ref 8–24)
CALCIUM SERPL-MCNC: 6.4 MG/DL (ref 8.5–10.1)
CHLORIDE SERPL-SCNC: 93 MMOL/L (ref 98–108)
CO2 SERPL-SCNC: 19 MMOL/L (ref 21–32)
CREAT SERPL-MCNC: 5.28 MG/DL (ref 0.6–1.2)
DEPRECATED RDW RBC AUTO: 43.4 FL (ref 35.1–46.3)
EOSINOPHIL # BLD: 0 K/MM3 (ref 0–0.68)
EOSINOPHIL NFR BLD: 0 % (ref 0–6)
ERYTHROCYTE [DISTWIDTH] IN BLOOD BY AUTOMATED COUNT: 14.6 % (ref 11.7–14.2)
GLOBULIN SER CALC-MCNC: 3.5 G/DL (ref 2.2–4)
GLUCOSE SERPL-MCNC: 195 MG/DL (ref 70–99)
HCT VFR BLD AUTO: 42.1 % (ref 37–53)
HCT VFR BLD AUTO: 44.1 % (ref 37–53)
HGB BLD-MCNC: 14.2 G/DL (ref 13.5–17.5)
HGB BLD-MCNC: 15 G/DL (ref 13.5–17.5)
LYMPHOCYTES # BLD: 0.25 K/MM3 (ref 0.84–5.2)
LYMPHOCYTES NFR BLD: 2 % (ref 21–46)
MCHC RBC AUTO-ENTMCNC: 33.7 G/DL (ref 31.5–36.5)
MCV RBC AUTO: 83 FL (ref 80–100)
MONOCYTES # BLD: 0.5 K/MM3 (ref 0.16–1.47)
MONOCYTES NFR BLD: 4 % (ref 4–13)
NEUTS BAND NFR BLD MANUAL: 8 % (ref 0–8)
NEUTS SEG # BLD MANUAL: 11.86 K/MM3 (ref 1.96–9.15)
NEUTS SEG NFR BLD MANUAL: 86 % (ref 41–73)
NRBC # BLD AUTO: 0.08 K/MM3 (ref 0–0.02)
NRBC BLD AUTO-RTO: 0.6 /100 WBC (ref 0–0.2)
PH BLDA: 7.44 [PH] (ref 7.35–7.45)
PLATELET # BLD AUTO: 220 K/MM3 (ref 150–400)
POTASSIUM SERPL-SCNC: 5.1 MMOL/L (ref 3.5–5.5)
PROT SERPL-MCNC: 5.4 G/DL (ref 6.4–8.2)
SODIUM SERPL-SCNC: 126 MMOL/L (ref 136–145)
TOTAL CELLS COUNTED BLD: 100
VANCOMYCIN SERPL-MCNC: 23 UG/ML

## 2021-12-30 PROCEDURE — 05HM33Z INSERTION OF INFUSION DEVICE INTO RIGHT INTERNAL JUGULAR VEIN, PERCUTANEOUS APPROACH: ICD-10-PCS | Performed by: INTERNAL MEDICINE

## 2021-12-30 NOTE — NUR
ASSUMED CARE @0700
PATIENT IS INTUBATED AND SEDATED ON PROPOFOL AND PRECEDEX, TITRATED BOTH UP
D/T PATIENT COUGHING AND APPEARING AGITATED. PATIENT DOES NOT OPEN EYES, MOVES
FINGERS AND FEET SPONTANEUOSLY. 02 SATS 94% ON VENT AC PC 22/5 35%. LUNGS
CLEAR TO DIMINISHED. SMALL AMOUNT OF TAN SPUTUM SUCTIONED. HR A.FIB 120s-140s
CONSISTENTLY, UP TO 160s. TURNED EPI OFF. BP STABLE ON LEVO AND VASO. TOES
REMAIN PRUPLE/BLUE, DOPPLER PULSES IN BLE. BOWEL SOUNDS ABSENT. KRAFT DRAINING
SCANT AMOUNT OF URINE. DR. WAYNE TO ROOM, ORDERS FOR HEPARIN gtt. WILL BE
PLACING DIALYSIS CATHETER SHORTLY. SEE SHIFT ASSESSMENT FOR MORE INFORMATION.

## 2021-12-30 NOTE — NUR
SHIFT SUMMARY
PATIENT IS INTUBATED AND SEDATED ON PROPOFOL AND PRECEDEX. DOES NOT OPEN EYES
BUT MOVES HANDS AND FEET SPONTANEOUSLY, WHEN NOT SEDATED ENOUGH COUGHS AGAINST
THE VENT AND 02 SATS DECREASE. 02 SATS 93% ON VENT AC PC 14/5 40%. RR 30s.
LUNG SOUNDS CLEAR TO DIMINISHED. HR A -140s AND UP  AT TIMES,
AMIO GTT STARTED AND -130. HEPARIN GTT ALSO STARTED PER DR. WAYNE.
BP STABLE ON LEVO AND VASO, TITRATED EPI OFF DUE TO HR.  BOWEL TONES ABSENT,
TUBE FEED STARTED AT 25 MLS/HR WITH 30 MLS FLUSHES Q4, GOAL RATE IS 50 MLS/HR.
40 MLS OUTPUT FROM KRAFT, DIALYSIS DONE AND 2L OFF. TURNED Q2 HOURS.
BILATERAL WRIST RESTRAINTS IN PLACE. ROHINI VISITED TODAY.

## 2021-12-30 NOTE — NUR
INSULIN GTT INF, LANTUS GIVEN AND WILL TURN INSULIN GTT OFF ONE HOUR AFTER.
START REGULAR INSULIN Q4 AFTER GTT OFF.

## 2021-12-30 NOTE — NUR
HYPOTENSION
RESTARTED EPI GTT DUE TO HYPOTESION. LEVOPHED UP TO 30 MCQ/MIN AND EPI AT 4
MCQ/MIN. NOTIFIED DR WAYNE, RECEIVED ORDER FOR FAISAL-SYNEPHRINE.

## 2021-12-30 NOTE — NUR
ASSESSMENT/ASSUMED CARE
[facility]UB[facility] AND [facility]. LUNG CLEAR BUT DECREAED IN THE BASES. VENT
SETTINGS AC/PC 28 PEEP 5 FIO2 40% PI 14. SUCTIONED SMALL AMT CLEAR SECRECTIONS
VIA ET TUBE. HEART RATE IRREGULAR-AFIB. PT ON AMIODARONE AT 0.5 MG/MIN. BP VIA
CLEVE TO RIGHT GROIN. ZEROED CLEVE. LEVOPHED AT 25 MCQ/MIN WITH VASOPRESSIN AT
0.04 UNIT/MIN. TITRATE TO KEEP MAP GREATER THAN 65. MANUAL BP DONE CORRELATES
WITH CLEVE. GENERAL EDEMA. PEDAL PULSES VIA DOPPLER. TOES PURPLE. BT+ VERY
HYPOACTIVE. OG WITH TUBE FEED VITAL HP 1.0 RHONDA AT 25 ML/HR WITH WATER 30 ML
Q4HR. RESIDUAL ZERO. KRAFT CATH WITH SMALL AMT CLEAR YELLOW URINE IN TUBE.
COCCXY DRSG INTACT. DIALYSIS CATH TO RIGHT IJ, DRSG INTACT, SITE CLEAR.
CENTRAL LINE TO RIGHT GROIN WITH CLEVE, DRSG MOIST. WILL REPLACE. REPOSITIONED
AND ORAL CARE DONE. BILAT SOFT WRIST RESTRAINTS ON. IV 18G TO RIGHT FOREARM
WITH NS AT 10 ML/HR FOR ANTIBIOTICS, SITE CLEAR.

## 2021-12-31 LAB
ALBUMIN SERPL BCP-MCNC: 1.8 G/DL (ref 3.4–5)
ALBUMIN SERPL BCP-MCNC: 2 G/DL (ref 3.4–5)
ALBUMIN/GLOB SERPL: 0.5 {RATIO} (ref 0.8–1.8)
ANION GAP SERPL CALCULATED.4IONS-SCNC: 21 MMOL/L (ref 6–16)
ANION GAP SERPL CALCULATED.4IONS-SCNC: 21 MMOL/L (ref 6–16)
AST SERPL W P-5'-P-CCNC: 1584 U/L (ref 12–37)
BASOPHILS # BLD: 0 K/MM3 (ref 0–0.23)
BASOPHILS NFR BLD: 0 % (ref 0–2)
BILIRUB SERPL-MCNC: 7.5 MG/DL (ref 0.1–1)
BUN SERPL-MCNC: 62 MG/DL (ref 8–24)
BUN SERPL-MCNC: 65 MG/DL (ref 8–24)
CALCIUM SERPL-MCNC: 6 MG/DL (ref 8.5–10.1)
CALCIUM SERPL-MCNC: 6.4 MG/DL (ref 8.5–10.1)
CHLORIDE SERPL-SCNC: 89 MMOL/L (ref 98–108)
CHLORIDE SERPL-SCNC: 90 MMOL/L (ref 98–108)
CO2 SERPL-SCNC: 13 MMOL/L (ref 21–32)
CO2 SERPL-SCNC: 14 MMOL/L (ref 21–32)
CREAT SERPL-MCNC: 6.06 MG/DL (ref 0.6–1.2)
CREAT SERPL-MCNC: 6.43 MG/DL (ref 0.6–1.2)
DEPRECATED RDW RBC AUTO: 45.1 FL (ref 35.1–46.3)
EOSINOPHIL # BLD: 0 K/MM3 (ref 0–0.68)
EOSINOPHIL NFR BLD: 0 % (ref 0–6)
ERYTHROCYTE [DISTWIDTH] IN BLOOD BY AUTOMATED COUNT: 15.1 % (ref 11.7–14.2)
GLOBULIN SER CALC-MCNC: 3.5 G/DL (ref 2.2–4)
GLUCOSE SERPL-MCNC: 316 MG/DL (ref 70–99)
GLUCOSE SERPL-MCNC: 379 MG/DL (ref 70–99)
HCT VFR BLD AUTO: 45.4 % (ref 37–53)
HEP C VIRUS AB: <0.1 (ref 0–0.9)
HGB BLD-MCNC: 15.1 G/DL (ref 13.5–17.5)
LYMPHOCYTES # BLD: 0.15 K/MM3 (ref 0.84–5.2)
LYMPHOCYTES NFR BLD: 1 % (ref 21–46)
MAGNESIUM SERPL-MCNC: 1.9 MG/DL (ref 1.6–2.4)
MCHC RBC AUTO-ENTMCNC: 33.3 G/DL (ref 31.5–36.5)
MCV RBC AUTO: 84 FL (ref 80–100)
METAMYELOCYTES # BLD MANUAL: 0.15 K/MM3 (ref 0–0)
METAMYELOCYTES NFR BLD MANUAL: 1 % (ref 0–0)
MONOCYTES # BLD: 1.85 K/MM3 (ref 0.16–1.47)
MONOCYTES NFR BLD: 12 % (ref 4–13)
NEUTS BAND NFR BLD MANUAL: 3 % (ref 0–8)
NEUTS SEG # BLD MANUAL: 13.28 K/MM3 (ref 1.96–9.15)
NEUTS SEG NFR BLD MANUAL: 83 % (ref 41–73)
NRBC # BLD AUTO: 0.16 K/MM3 (ref 0–0.02)
NRBC BLD AUTO-RTO: 1 /100 WBC (ref 0–0.2)
PHOSPHATE SERPL-MCNC: 6.9 MG/DL (ref 2.5–4.9)
PLATELET # BLD AUTO: 273 K/MM3 (ref 150–400)
POTASSIUM SERPL-SCNC: 5.3 MMOL/L (ref 3.5–5.5)
POTASSIUM SERPL-SCNC: 6 MMOL/L (ref 3.5–5.5)
PROT SERPL-MCNC: 5.3 G/DL (ref 6.4–8.2)
SODIUM SERPL-SCNC: 123 MMOL/L (ref 136–145)
SODIUM SERPL-SCNC: 125 MMOL/L (ref 136–145)
TOTAL CELLS COUNTED BLD: 100
VANCOMYCIN SERPL-MCNC: 16 UG/ML

## 2021-12-31 NOTE — NUR
SHIFT SUMMARY
PT CONT INTUBATED AND ON [facility] VENT. VENT CHANGES DURING THE NIGHT PEEP
INCREASED TO 6 BUT NOW BACK DOWN TO 5. FIO2 INCREASED TO 60 %. CURRENT SPO2
99%. LUNGS CONT CLEAR BUT DECREASED IN THE BASES. HEART RATE DOWN 'S AND
SINUS. PT CONT ON AMIODARONE GTT AT 0.5 MG/MIN. PT HAD HYPOTENSION DURING THE
NIGHT AND WAS RESTARTED ON EPI AND STARTED ON FAISAL-SYNEPHRINE. CURRENTLY PT ON
LEVOPHED 30 MCQ/MIN, VASOPRESSIN 0.04 UNITS/MIN, EPI 6 MCQ/MIN, AND
FAISAL-SYNEPHRINE  MCQ/MIN TO KEEP MAP ABOVE 65. IMPROVED BP AFTER
FAISAL-SYNEPHRINE STARTED. PT CONT SEDATED WITH PROPOFOL AT 25 MCQ/KG/MIN AND
PRECEDEX 0.4 MCQ/KG/MIN. BILAT SOFT WRIST RESTRAINTS ON. PT TURNED Q2HR. TUBE
FEED VITAL HP 1.0CAL AT GOAL RATE OF 50 ML/HR, WATER 30 ML Q4HR. MIN
RESIDUALS. POTASSIUM CRITICAL HIGH THIS AM AT 6.0, CALLED TO DR BUSTOS. BUMEX
4MG GIVEN. PT TO HAVE DIALYSIS TODAY. TMAX 101.5, TYLENOL GIVEN, ICE PACKS
APPLIED AND FAN ON PT. TEMP NOW DOWN TO 99.5. DR WAYNE INTO SEE PT. INSTRUCTED
TO RESTART INSULIN GTT. REPORT TO ON COMING NURSE

## 2021-12-31 NOTE — NUR
SHIFT SUMMARY
PATIENT REMAINS INTUBATED AND SEDATED ON PROPOFOL AND PRECEDEX. VENT SETTINGS
REMAIN UNCHANGED. REMAINS IN A.FIB RATE 90s-120, AMIO GTT CONTINUOUS PER DR. FORREST. BP STABLE ON LEVO AND VASO. TRITRATED OFF EPI AND FAISAL, D/T BP
SIGNIFICANTLY IMPROVING DURING AND AFTER DIALYSIS. HEPARIN INF. SODIUM
BICARB INF. KRAFT OUTPUT OF 50 MLS DARK YELLLOW. TUBE FEED AT NEW GOAL RATE OF
25 ML/HR OF VITAL HP. BOWEL TONES ABSENT. INSULING GTT STILL INF. REPOSITIONED
Q2 HOURS.

## 2021-12-31 NOTE — NUR
ASSUMED PT CARE AT 1915
PT INTUBATED AND SEDATED. PROPOFOL AT 25MCG/KG/MIN, PRECEDEX AT 0.4 MCG/KG/HR.
VENT SETTINGS AC/PC 28, PI 14, PEEP 5, FIO2 45%; RESP RATE 30'S. SPO2 >90%. PT
DIFFICULT TO AROUSE WITH NO COUGH/GAG NOTED; THEREFORE, PRECEDEX TURNED OFF
UPON ASSUMPTION OF CARE. PT WITHDRAWALS AND GRIMACES TO PAINFUL STIMULI. PT
NOTED TO BE AFIB WITH RVR; RATE 100-120'S. BP'S STABLE WITH ART LINE TO RIGHT
FEMORAL. LEVOPHED AT 18 MCG/MIN, VASOPRESSIN AT 0.04 UNITS/MIN, AMIODARONE AT
0.5MCG/MIN, HEPARIN AT 20 UNITS/KG/HR, INSULIN AT 8 UNITS/HR, BICARB AT
50ML/HR VIA CENTRAL LINE TO RIGHT FEMORAL SITE. PT HAS A TRIALYSIS CATHETER TO
RIGHT IJ. KRAFT CATHETER IS PATENT AND DRAINING YELLOW URINE WITH SOME
SEDIMENT; DID IRRIGATE WITH 30CC OF STERILE WATER TO CHECK PATENCY. VERY
MINIMAL URINE OUTPUT NOTED. PT DID RECEIVE DIALYSIS TODAY PER REPORT. PT IS
VERY EDEMATOUS T/O WITH BILATERAL FEET NOTED TO BE CYANOTIC WITH DOPPLER
PULSES ONLY. SEE SHIFT SUMMARY FOR FURTHER DETAILS.

## 2021-12-31 NOTE — NUR
ASSUMED CARE @0700
PATIENT IS INTUBATED AND SEDATED ON PROPOFOL AND PRECEDEX. COUGHS WITH
SUCTIONING AND OCCASIONALLY MOVES FINGERS AND TOES TO PAINFUL STIMULI. NO EYE
OPENING. 02 SATS 94% ON VENT AT AC PC RATE 28, PEEP 5, FI02 55%, PI 14. LUNGS
CLEAR TO DIMINISHED. UNABLE TO GET SPUTUM SAMPLE AT THIS TIME WILL TRY AGAIN
LATER. HR A.-130s AMIO gtt TO BE CONTINUOUS.  BP STABLE ON LEVO, VASO,
EPI, AND FAISAL. BP IMPROVED WHEN DIALYSIS STARTED, STARTED TITRATING FAISAL DOWN
FIRST PER DR. FORREST.  BILATERAL FEET PURPLE/BLUE, DOPPLER PULSES FOUND. BOWEL
TONES ABSENT, TUBE FEED INF VITAL HP @ GOAL RATE 50 MLS/HR WITH 30 MLS FLUSHES
Q4 HOUR, NO RISIDUAL. KRAFT DRANING SCANT AMOUNT OF URINE. SODIUM BICARN gtt
STARTED PER DR. BUSTOS. PATIENT CURRENTLY RECIEVING DIALYSIS. SEE SHIFT
ASSESSMENT FOR MORE DETAIL.

## 2021-12-31 NOTE — NUR
CRITICAL LAB
POTASSIUM 6.0 NOTIFIED DR BUSTOS, REVIEWED OTHER LABS. OBTAINED ORDER FOR 4MG
BUMEX IV NOW-GIVEN, AND DIALYSIS TODAY.

## 2022-01-01 LAB
ALBUMIN SERPL BCP-MCNC: 1.8 G/DL (ref 3.4–5)
ALBUMIN/GLOB SERPL: 0.5 {RATIO} (ref 0.8–1.8)
ALT SERPL W P-5'-P-CCNC: 3684 U/L (ref 12–78)
ANION GAP SERPL CALCULATED.4IONS-SCNC: 14 MMOL/L (ref 6–16)
AST SERPL W P-5'-P-CCNC: 564 U/L (ref 12–37)
BASOPHILS # BLD: 0 K/MM3 (ref 0–0.23)
BASOPHILS NFR BLD: 0 % (ref 0–2)
BILIRUB DIRECT SERPL-MCNC: 6.9 MG/DL (ref 0–0.3)
BILIRUB INDIRECT SERPL-MCNC: 1.1 MG/DL (ref 0.1–0.7)
BILIRUB SERPL-MCNC: 8 MG/DL (ref 0.1–1)
BUN SERPL-MCNC: 68 MG/DL (ref 8–24)
CALCIUM SERPL-MCNC: 6.9 MG/DL (ref 8.5–10.1)
CHLORIDE SERPL-SCNC: 92 MMOL/L (ref 98–108)
CO2 SERPL-SCNC: 22 MMOL/L (ref 21–32)
CREAT SERPL-MCNC: 6.4 MG/DL (ref 0.6–1.2)
DEPRECATED RDW RBC AUTO: 44.3 FL (ref 35.1–46.3)
EOSINOPHIL # BLD: 0 K/MM3 (ref 0–0.68)
EOSINOPHIL NFR BLD: 0 % (ref 0–6)
ERYTHROCYTE [DISTWIDTH] IN BLOOD BY AUTOMATED COUNT: 15 % (ref 11.7–14.2)
GLOBULIN SER CALC-MCNC: 3.8 G/DL (ref 2.2–4)
GLUCOSE SERPL-MCNC: 167 MG/DL (ref 70–99)
HCT VFR BLD AUTO: 43.1 % (ref 37–53)
HCT VFR BLD AUTO: 44.1 % (ref 37–53)
HGB BLD-MCNC: 14.6 G/DL (ref 13.5–17.5)
HGB BLD-MCNC: 14.9 G/DL (ref 13.5–17.5)
LYMPHOCYTES # BLD: 1.33 K/MM3 (ref 0.84–5.2)
LYMPHOCYTES NFR BLD: 8 % (ref 21–46)
MAGNESIUM SERPL-MCNC: 2.1 MG/DL (ref 1.6–2.4)
MCHC RBC AUTO-ENTMCNC: 33.9 G/DL (ref 31.5–36.5)
MCV RBC AUTO: 82 FL (ref 80–100)
METAMYELOCYTES # BLD MANUAL: 0.16 K/MM3 (ref 0–0)
METAMYELOCYTES NFR BLD MANUAL: 1 % (ref 0–0)
MONOCYTES # BLD: 2 K/MM3 (ref 0.16–1.47)
MONOCYTES NFR BLD: 12 % (ref 4–13)
MYELOCYTES # BLD MANUAL: 0.16 K/MM3 (ref 0–0)
MYELOCYTES NFR BLD MANUAL: 1 % (ref 0–0)
NEUTS BAND NFR BLD MANUAL: 4 % (ref 0–8)
NEUTS SEG # BLD MANUAL: 13.03 K/MM3 (ref 1.96–9.15)
NEUTS SEG NFR BLD MANUAL: 74 % (ref 41–73)
NRBC # BLD AUTO: 0.07 K/MM3 (ref 0–0.02)
NRBC BLD AUTO-RTO: 0.4 /100 WBC (ref 0–0.2)
PHOSPHATE SERPL-MCNC: 6.4 MG/DL (ref 2.5–4.9)
PLATELET # BLD AUTO: 214 K/MM3 (ref 150–400)
POTASSIUM SERPL-SCNC: 5 MMOL/L (ref 3.5–5.5)
PROT SERPL-MCNC: 5.6 G/DL (ref 6.4–8.2)
PROTHROMBIN TIME: 29.3 SEC (ref 9.7–11.5)
SODIUM SERPL-SCNC: 128 MMOL/L (ref 136–145)
TOTAL CELLS COUNTED BLD: 100
VANCOMYCIN SERPL-MCNC: 14.4 UG/ML

## 2022-01-01 NOTE — NUR
SHIFT SUMMARY....
 
THE PT'S SEDATION WAS OFF UNTIL APROX 1330 THIS AFTERNOON, THE PT WOULD OPEN
HIS EYES TO VERBAL STIMULI, NOD OR SHAKE HIS HEAD APPROPRIATELY TO SIMPLE
"YES/NO" QUESTIONS, THE PT WAS ABLE TO WRIGGLE HIS TOES BILATERALLY UPON
COMMAND BUT HE WAS UNABLE TO MOVE HIS ARMS/HANDS. THE PT HAS NOT HAD A BM THIS
SHIFT, A SUPPOSITORY WAS GIVEN PER ORDERS. THERE WERE NO RESIDUALS FROM THE OG
TUBE AFTER 1400, PER DR. FORREST RESTART TUBE FEEDS AT 10MLS/HR AND REASSESS
1/2/22. THE PT IS CURRENTLY ON LEVOPHED AT 16MCG/MIN, THE VASOPRESSIN WAS OFF
APROX 2 HOURS THEN RESTARTED. THE PT'S HEPARIN GTT IS RUNNING AT 20U/KG/HR.
AMIO GTT CONTINUES AT 0.5MG/HR. THE PROPOFOL GTT WAS RESTARTED APROX 1400 D/T
SEVERE AGITATION IT IS CURRENTLY RUNNING AT 20MCG. THE INSULIN GTT WAS STOPPED
AND SUB Q INSULIN STARTED PER ORDERS.
 
WILL CONTINUE TO MONITOR UNTIL REPORT IS GIVEN TO ONCOMING RN.

## 2022-01-01 NOTE — NUR
AM NOTE....
 
ASSUMED CARE OF PT AT 0700, THE PT IS INTUBATED AND SEDATED ON PROPOFOL OF
15MCG, THIS WAS STOPPED ONCE THIS RN WAS IN THE ROOM TO ASSESS THE PT'S
MENTATION/RESPONSE. THE PT'S VENT SETINGS ARE AC/PC 28/14/5/45% WITH O2 SATS
>90%. L/S CLEAR AND DIM T/O. THE PT IS ON LEVOPHED GTT RUNNING AT 22MCG/MIN,
VASOPRESSIN IS RUNNING AT 0.04UNITS/HR WITH MAPS >65. ART LINE IN PLACE, THIS
WAS ZEROED AT THE START OF THIS SHIFT. THE PT IS IN AFIB W/RVR IN THE
100'S-130'S. THE PT HAS 2+ EDEMA NOTED TO HIS BLE, DEPENDENT EDEMA NOTED TO
HIS BUE, SCROTUM/PENIS AND HIS TRUNK. DIALYSIS CATH NOTED TO THE PT'S RIGHT
NECK. THE PT IS IN AN INSULIN GTT RUNNING AT 7UNITS/HR WITH HOURLY CBG CHECKS.
HEPARIN GTT RUNNING AT 20 UNITS/KG/HR PER ORDERS. AMIO GTT RUNNING AT
0.5MG/HR. BT ABSENT DURING THIS ASSESSMENT, THE PT'S OG TUBE WAS CLAMPED SINCE
APROX 2130 PER REPORT. RESIDUAL CHECKS BY THIS RN FOUND 450MLS OF BLACK/DARK
[place]BROWN GASTRIC CONTENTS, ICU PROVIDER AWARE, THIS WAS SENT TO THE LAB,
NONE WAS RE-INSTILLED. AFTER APROX 30 MINS OF THE PROPOFOL BEING OFF THE PT
WAS RESPONDING TO THIS RN'S VOICE, HE WAS ABLE TO MOVE BOTH LEGS PER COMMAND,
HE OPENED HIS EYES BUT WAS UNABLE TO KEEP THEM OPEN, THE PT WAS ABLE TO
SHAKE/NOD HIS HEAD APPROPRIATELY TO SIMPLE "YES NO" QUESTIONS. THE PT WAS
UNABLE TO MOVE HIS BUE DURING THIS ASSESSMENT. THE PT'S KRAFT IS PATENT AND
DRAINING TO GRAVITY. THE PT IS TO HAVE DIALYSIS TODAY.
 
WILL CONTINUE TO MONITOR.

## 2022-01-01 NOTE — NUR
END OF SHIFT SUMMARY
NO SIGNIFICANT CHANGES THIS SHIFT. VENT SETTINGS REMAIN UNCHANGED. PROPOFOL AT
15MCG/KG/MIN. PT WITHDRAWALS AND GRIMACES FROM NOXIOUS STIMULI; HOWEVER,
DOES NOT MAKE ANY PURPOSEFUL MOVMENTS. GROSS MOTOR MOVEMENT NOTED TO BILATERAL
FEET. WHEN ASKED TO OPEN EYES, PT WILL RAISE EYEBROWS, BUT UNABLE TO OPEN
EYES. REMOVED FROM RESTRAINTS D/T NO MOVEMENT NOTED TO EXTREMITIES. PT REMAINS
AFIB WITH RVR; RATE 100-120'S. LEVOPHED AT 22MCG/MIN, VASOPRESSIN 0.04
UNITS/MIN. AMIODARONE 0.5MCG/MIN. HEPARIN 20 UNITS/KG/HR. INSULIN AT 10
UNITS/HR. ART LINE AND CENTRAL LINE TO RIGHT GROIN. TRIALYSIS CATH TO RIGHT
IJ. TUBE FEEDS REMAIN ON HOLD. KRAFT CATHETER IS PATENT AND DRAINING YELLOW
URINE WITH SEDIMENT TO GRAVITY. NO BM THIS SHIFT; HOWEVER, BOWEL TONES ARE
ABSENT. PT REMAINS VERY EDEMATOUS T/O. DEEP TISSUE INJURY WTH EXCORIATION
NOTED TO SACRUM/COCCYX REGION; HYDROCOLLOID DRESSING APPLIED AFTER BATH. VSS,
SEE FLOWSHEET. WILL CONTINUE TO MONITOR UNTIL REPORT IS HANDED OFF TO ONCOMING
RN.

## 2022-01-01 NOTE — NUR
CALL TO DR. FORREST
RESIDUAL CHECK OF 300CC OF DARK BROWN/BLACK STOMACH CONTENTS. ORDERS TO HOLD
TRICKLE FEED AND CONNECT TO LIS. HELD HS PER TUBE MEDS.

## 2022-01-01 NOTE — NUR
ASSUMED PT CARE AT 1915
PT INTUBATED AND SEDATED. TURNED OFF PROPOFOL UPON ASSUMPTION OF CARE TO
ASSESS BASELINE NEURO STATUS. PT ABLE TO MAKE GROSS MOTOR MOVEMENTS WITH
BILATERAL FEET, AS WELL AS ABLE TO MOVE HEAD SIDE TO SIDE. DOES OPEN EYES TO
VERBAL STIMULI, NODS HEAD YES/NO TO QUESTIONS, MOVES TOES UPON COMMAND;
HOWEVER, STILL UNABLE TO SQUEEZE HANDS, OR MOVE BILATERAL UPPER EXTREMITIES.
WHEN ASKED IF PT WAS IN PAIN, HE NODDED "YES".  VENT SETTINGS REMAIN AC/PC 28,
PI 14, PEEP 5, FIO2 40%. RESP RATE 30. SPO2 >90%. PT IS AFIB WITH RVR; RATE
120-130'S. BP'S STABLE WITH LEVOPHED AT 16 MCG/MIN AND VASOPRESSIN AT 0.04
UNITS/MIN. ART LINE TO RIGHT FEMORAL SITE. CENTRAL LINE TO RIGHT FEMORAL SITE
AS WELL WITH HEPARIN INFUSING AT 20 UNITS/KG/HR, AND BICARB IN 1/2 NS INFUSING
AT 50ML/HR.  TRIALYSIS CATH TO RIGHT IJ WITH AMIODARONE INFUSING AT
0.5MCG/MIN. PER REPORT PT RECEIVED DIALYSIS TODAY. PT REMAINS OLIGURIC WITH
MINIMAL YELLOW URINE NOTED TO UROMETER. TUBE FEEDS STILL OFF AT THIS TIME;
HOWEVER, NEW ORDERS TO RESTART TRICKLE FEEDS AT 10ML/HR. BILATERAL SOFT WRIST
RESTRAINTS REMOVED AT THIS TIME D/T PT NOT ABLE TO MOVE EITHER UPPER
EXTREMITY; WILL CONTINUE TO REASSESS THIS NEED FOR SAFETY AND PREVENTION OF
SELF EXTUBATION. SEE SHIFT SUMMARY FOR FURTHER DETAILS.

## 2022-01-02 LAB
ALBUMIN SERPL BCP-MCNC: 1.7 G/DL (ref 3.4–5)
ALBUMIN SERPL BCP-MCNC: 1.8 G/DL (ref 3.4–5)
ALBUMIN/GLOB SERPL: 0.6 {RATIO} (ref 0.8–1.8)
ANION GAP SERPL CALCULATED.4IONS-SCNC: 14 MMOL/L (ref 6–16)
ANION GAP SERPL CALCULATED.4IONS-SCNC: 18 MMOL/L (ref 6–16)
AST SERPL W P-5'-P-CCNC: 274 U/L (ref 12–37)
BASOPHILS # BLD: 0 K/MM3 (ref 0–0.23)
BASOPHILS NFR BLD: 0 % (ref 0–2)
BILIRUB DIRECT SERPL-MCNC: 8.1 MG/DL (ref 0–0.3)
BILIRUB INDIRECT SERPL-MCNC: 1.2 MG/DL (ref 0.1–0.7)
BILIRUB SERPL-MCNC: 9.3 MG/DL (ref 0.1–1)
BUN SERPL-MCNC: 79 MG/DL (ref 8–24)
BUN SERPL-MCNC: 84 MG/DL (ref 8–24)
CALCIUM SERPL-MCNC: 6.7 MG/DL (ref 8.5–10.1)
CALCIUM SERPL-MCNC: 7.6 MG/DL (ref 8.5–10.1)
CHLORIDE SERPL-SCNC: 91 MMOL/L (ref 98–108)
CHLORIDE SERPL-SCNC: 94 MMOL/L (ref 98–108)
CO2 SERPL-SCNC: 21 MMOL/L (ref 21–32)
CO2 SERPL-SCNC: 23 MMOL/L (ref 21–32)
CREAT SERPL-MCNC: 6.23 MG/DL (ref 0.6–1.2)
CREAT SERPL-MCNC: 7 MG/DL (ref 0.6–1.2)
DEPRECATED RDW RBC AUTO: 43.2 FL (ref 35.1–46.3)
EOSINOPHIL # BLD: 0 K/MM3 (ref 0–0.68)
EOSINOPHIL NFR BLD: 0 % (ref 0–6)
ERYTHROCYTE [DISTWIDTH] IN BLOOD BY AUTOMATED COUNT: 15.1 % (ref 11.7–14.2)
GLOBULIN SER CALC-MCNC: 3.2 G/DL (ref 2.2–4)
GLUCOSE SERPL-MCNC: 255 MG/DL (ref 70–99)
GLUCOSE SERPL-MCNC: 273 MG/DL (ref 70–99)
HCT VFR BLD AUTO: 43.1 % (ref 37–53)
HGB BLD-MCNC: 14.8 G/DL (ref 13.5–17.5)
LYMPHOCYTES # BLD: 1.19 K/MM3 (ref 0.84–5.2)
LYMPHOCYTES NFR BLD: 7 % (ref 21–46)
MAGNESIUM SERPL-MCNC: 2.4 MG/DL (ref 1.6–2.4)
MCHC RBC AUTO-ENTMCNC: 34.3 G/DL (ref 31.5–36.5)
MCV RBC AUTO: 80 FL (ref 80–100)
METAMYELOCYTES # BLD MANUAL: 0.17 K/MM3 (ref 0–0)
METAMYELOCYTES NFR BLD MANUAL: 1 % (ref 0–0)
MONOCYTES # BLD: 0.85 K/MM3 (ref 0.16–1.47)
MONOCYTES NFR BLD: 5 % (ref 4–13)
MYELOCYTES # BLD MANUAL: 0.85 K/MM3 (ref 0–0)
MYELOCYTES NFR BLD MANUAL: 5 % (ref 0–0)
NEUTS BAND NFR BLD MANUAL: 8 % (ref 0–8)
NEUTS SEG # BLD MANUAL: 13.94 K/MM3 (ref 1.96–9.15)
NEUTS SEG NFR BLD MANUAL: 74 % (ref 41–73)
NRBC # BLD AUTO: 0.14 K/MM3 (ref 0–0.02)
NRBC BLD AUTO-RTO: 0.8 /100 WBC (ref 0–0.2)
PH BLDA: 7.41 [PH] (ref 7.35–7.45)
PHOSPHATE SERPL-MCNC: 8.4 MG/DL (ref 2.5–4.9)
PHOSPHATE SERPL-MCNC: 8.8 MG/DL (ref 2.5–4.9)
PLATELET # BLD AUTO: 187 K/MM3 (ref 150–400)
POTASSIUM SERPL-SCNC: 5.7 MMOL/L (ref 3.5–5.5)
POTASSIUM SERPL-SCNC: 6.6 MMOL/L (ref 3.5–5.5)
PROT SERPL-MCNC: 5 G/DL (ref 6.4–8.2)
SODIUM SERPL-SCNC: 130 MMOL/L (ref 136–145)
SODIUM SERPL-SCNC: 131 MMOL/L (ref 136–145)
TOTAL CELLS COUNTED BLD: 100

## 2022-01-02 NOTE — NUR
AM NOTE...
 
ASSUMED CARE OF PT AT 0700, THE PT IS INTUBATED AND SEDATED, VENT SETTINGS ARE
AC/PC: 28/14/5/40% WITH O2 SATS >90%. L/S DIM T/O, MORE DIM THAN YESTERDAY'S
ASSESSMENTS. NO SECRETIONS WERE SUCTIONED VIA ET TUBE DURING THIS ASSESSMENT.
THE PT IS IN NSR IN THE 90'S, LEVOPHED WAS SET TO 14MCG/MIN AT THE START OF
THIS SHIFT, THIS WAS TITRATED TO 12MCG/MIN. VASOPRESSIN IS RUNNING AT
0.04UNTIS/HR TO KEEP THE MAPS >60. ART LINE IN THE RIGHT FEMORAL, ZEROED
DURING AM ASSESSMENT. CENTRAL LINE TO THE RIGHT GRION AREA AS WELL, THE
DRESSING IS COVERING BOTH SITES, THE DRESSING WAS CHANGED DURING NOC SHIFT BUT
D/T SEROUS DRAINAGE FROM THE SITES THE DRESSING IS SLIGHTLY DAMP. 2+ PITTING
EDEMA NOTED TO THE PT'S BLE/FEET, THE PT'S SCROTUM AND PENIS IS ALSO VERY
EDEMATOUS. THE PT'S BUE AND TORSO ALSO HAVE DEPENDENT EDEMA. PULSES TO THE BLE
ARE FOUND WITH DOPPLER, FEET AND TOES ARE CYANNOTIC, CAP REFILL IS >3 SECONDS.
BT ARE ABSENT, ABD HAS MODERATE AMOUNT OF DISTENTION, IS FIRM AND THE PT
GRIMACES ALOT WITH PALPATION. TEMP KRAFT IS PATENT AND DRAINING SCANT AMOUNT
OF YELLOW URINE TO GRAVITY, PLAN IS TO DO DIALYSIS THIS AM AGAIN. DRESSING TO
THE PT'S COCCYX IS C/D/I AT THIS TIME, PURPLE BRUISE IS NOTED TO UPPER AREA OF
THE PT'S RIGHT EAR, THE O2 PROBE WAS NOT ON THE PT'S RIGHT EAR YESTERDAY BUT
THE PT'S HEAD WAS LEANING TO THAT SIDE DESPITE Q2 TURNS. WILL CONTINUE TO
MONITOR THAT AREA.
 
WILL CONTINUE TO MONITOR.

## 2022-01-02 NOTE — NUR
PT UPDATE....
 
AT 0815 PT CONVERTED FROM NSR IN THE 90'S TO AFIB IN 'S-120'S. BP
CONTINUES TO BE STABLE ON LEVOPHED AT 10MCG/MIN AND VASOPRESSIN. PROVIDER
AWARE.
 
WILL CONTINUE TO MONITOR.

## 2022-01-02 NOTE — NUR
SHIFT SUMMARY....
 
NO ACUTE NEGATIVE CHANGES NOTED THIS SHIFT. THE PT HAD DIALYSIS AND TOLERATED
THAT WELL. THE VASOPRESSIN HAS BEEN TURNED OFF AND THE LEVOPHED IS RUNNING AT
6MCG/MIN WITH MAPS >60. THE PT HAD A SMALL LOOSE BROWN BM THIS SHIFT, TUBE
FEEDS WER RESTARTED AT 10MLS/HR PER DR. FORREST. THE PT'S DAUGHTER WAS AT THE
BEDSIDE DURING VISITING HOURS, SHE WAS UPDATED ON THE PT'S CONDITION AND THE
PLAN OF CARE. THE PT'S ART LINE AND R GROIN CENTRAL LINE DRESSING CONTINUES TO
LEAK SEROUS FLUIDS, THE DRESSING WAS CHANGED ONCE EARLY IN THIS SHIFT. MEPILEX
TO THE PT'S COCCYX WOUND WAS CHANGED. Q2 TURNS WERE DONE WITH T/O THIS SHIFT.
THE PT'S KRAFT IS PATENT AND DRAINING SCANT URINE TO GRAVITY. THE PT WAS ABLE
TO FOLLOW COMMANDS DURING THE SEDATION VACATION THIS SHIFT. THE HEPARIN GTT IS
RUNNING AT 17U/KG/HR PER ORDERS, THIS WAS STOPPED THIS AFTERNOON FOR 2 HRS D/T
A CRITICALLY HIGH PTT, THEN RESTARTED PER PHARMACY.
 
WILL CONTINUE TO MONITOR UNTIL REPORT IS GIVEN TO ONCOMING RN.

## 2022-01-02 NOTE — NUR
PT UPDATE....
 
TUBE FEEDS RESTARTED PER DR. FORREST AT 1530, THE PT'S OG TUBE WAS SET TO LOW
INT SUCTION, THERE WAS 200MLS OF DARK BROWN GASTRIC FLUID THAT WAS REMOVED
SINCE THE START OF THIS SHIFT AT 0700. THE PT HAD A SMALL LOOSE BROWN BM THIS
AFTERNOON.
 
WILL CONTINUE TO MONITOR.

## 2022-01-02 NOTE — NUR
ASSUMED PT CARE FROM REESE GILBERT AT 1915
PT REMAINS INTUBATED AND SEDATED. PROPOFOL AT 20MCG/KG/MIN. VENT SETTINGS
AC/PC 28, PI 14, PEEP 5, FIO2 40%, RESP RATE 30'S. SPO2 >90%. ART LINE TO
RIGHT FEMORAL SITE THAT HAS BEEN ZERO'D. PT IS AFIB WITH RVR; RATE 100-120'S.
BP'S STABLE, SEE FLOWSHEET. CENTRAL LINE ALSO TO RIGHT FEMORAL SITE WITH
LEVOPHED AT 5MCG/MIN, HEPARIN AT 18 UNITS/KG/HR, AND BICARB AT 50ML/HR.
TRIALYSIS CATH TO RIGHT IJ INFUSING AMIODARONE AT 0.5MCG/MIN. PT CONTINUES TO
PUT OUT MINIMAL AMOUNTS OF URINE OUTPUT. PT REMAINS VERY EDEMATOUS WITH SEVERE
SCROTAL AND PENILE EDEMA. TRICKLE FEEDS OF VHP AT 10ML/HR; 275CC RESIDUAL;
HOWEVER, PT WAS NOTED TO HAVE HYPOACTIVE BTX4. SEE SHIFT SUMMARY FOR FURTHER
DETAILS.

## 2022-01-03 LAB
ALBUMIN SERPL BCP-MCNC: 1.5 G/DL (ref 3.4–5)
ALBUMIN SERPL BCP-MCNC: 1.7 G/DL (ref 3.4–5)
ALBUMIN/GLOB SERPL: 0.4 {RATIO} (ref 0.8–1.8)
ALT SERPL W P-5'-P-CCNC: 1578 U/L (ref 12–78)
ANION GAP SERPL CALCULATED.4IONS-SCNC: 11 MMOL/L (ref 6–16)
ANION GAP SERPL CALCULATED.4IONS-SCNC: 12 MMOL/L (ref 6–16)
ANION GAP SERPL CALCULATED.4IONS-SCNC: 14 MMOL/L (ref 6–16)
AST SERPL W P-5'-P-CCNC: 157 U/L (ref 12–37)
BASOPHILS # BLD: 0 K/MM3 (ref 0–0.23)
BASOPHILS NFR BLD: 0 % (ref 0–2)
BILIRUB SERPL-MCNC: 9 MG/DL (ref 0.1–1)
BUN SERPL-MCNC: 106 MG/DL (ref 8–24)
BUN SERPL-MCNC: 94 MG/DL (ref 8–24)
BUN SERPL-MCNC: 97 MG/DL (ref 8–24)
CALCIUM SERPL-MCNC: 6.6 MG/DL (ref 8.5–10.1)
CALCIUM SERPL-MCNC: 7.3 MG/DL (ref 8.5–10.1)
CALCIUM SERPL-MCNC: 7.7 MG/DL (ref 8.5–10.1)
CHLORIDE SERPL-SCNC: 103 MMOL/L (ref 98–108)
CHLORIDE SERPL-SCNC: 93 MMOL/L (ref 98–108)
CHLORIDE SERPL-SCNC: 97 MMOL/L (ref 98–108)
CO2 SERPL-SCNC: 21 MMOL/L (ref 21–32)
CO2 SERPL-SCNC: 23 MMOL/L (ref 21–32)
CO2 SERPL-SCNC: 24 MMOL/L (ref 21–32)
CREAT SERPL-MCNC: 5.93 MG/DL (ref 0.6–1.2)
CREAT SERPL-MCNC: 6.61 MG/DL (ref 0.6–1.2)
CREAT SERPL-MCNC: 7.24 MG/DL (ref 0.6–1.2)
DEPRECATED RDW RBC AUTO: 42.5 FL (ref 35.1–46.3)
EOSINOPHIL # BLD: 0 K/MM3 (ref 0–0.68)
EOSINOPHIL NFR BLD: 0 % (ref 0–6)
ERYTHROCYTE [DISTWIDTH] IN BLOOD BY AUTOMATED COUNT: 15.1 % (ref 11.7–14.2)
GLOBULIN SER CALC-MCNC: 3.6 G/DL (ref 2.2–4)
GLUCOSE SERPL-MCNC: 176 MG/DL (ref 70–99)
GLUCOSE SERPL-MCNC: 179 MG/DL (ref 70–99)
GLUCOSE SERPL-MCNC: 197 MG/DL (ref 70–99)
HCT VFR BLD AUTO: 39.7 % (ref 37–53)
HGB BLD-MCNC: 13.9 G/DL (ref 13.5–17.5)
LYMPHOCYTES # BLD: 0.59 K/MM3 (ref 0.84–5.2)
LYMPHOCYTES NFR BLD: 4 % (ref 21–46)
MAGNESIUM SERPL-MCNC: 2.7 MG/DL (ref 1.6–2.4)
MCHC RBC AUTO-ENTMCNC: 35 G/DL (ref 31.5–36.5)
MCV RBC AUTO: 79 FL (ref 80–100)
METAMYELOCYTES # BLD MANUAL: 0.14 K/MM3 (ref 0–0)
METAMYELOCYTES NFR BLD MANUAL: 1 % (ref 0–0)
MONOCYTES # BLD: 1.33 K/MM3 (ref 0.16–1.47)
MONOCYTES NFR BLD: 9 % (ref 4–13)
MYELOCYTES # BLD MANUAL: 0.74 K/MM3 (ref 0–0)
MYELOCYTES NFR BLD MANUAL: 5 % (ref 0–0)
NEUTS BAND NFR BLD MANUAL: 4 % (ref 0–8)
NEUTS SEG # BLD MANUAL: 12.04 K/MM3 (ref 1.96–9.15)
NEUTS SEG NFR BLD MANUAL: 77 % (ref 41–73)
NRBC # BLD AUTO: 0.08 K/MM3 (ref 0–0.02)
NRBC BLD AUTO-RTO: 0.5 /100 WBC (ref 0–0.2)
PH BLDA: 7.43 [PH] (ref 7.35–7.45)
PHOSPHATE SERPL-MCNC: 8.7 MG/DL (ref 2.5–4.9)
PHOSPHATE SERPL-MCNC: 9 MG/DL (ref 2.5–4.9)
PLATELET # BLD AUTO: 153 K/MM3 (ref 150–400)
POTASSIUM SERPL-SCNC: 5.3 MMOL/L (ref 3.5–5.5)
POTASSIUM SERPL-SCNC: 6 MMOL/L (ref 3.5–5.5)
POTASSIUM SERPL-SCNC: 6.4 MMOL/L (ref 3.5–5.5)
PROT SERPL-MCNC: 5.1 G/DL (ref 6.4–8.2)
PROTHROMBIN TIME: 18.7 SEC (ref 9.7–11.5)
SODIUM SERPL-SCNC: 130 MMOL/L (ref 136–145)
SODIUM SERPL-SCNC: 132 MMOL/L (ref 136–145)
SODIUM SERPL-SCNC: 136 MMOL/L (ref 136–145)
TOTAL CELLS COUNTED BLD: 100

## 2022-01-03 NOTE — NUR
END OF SHIFT SUMMARY
PT REMAINS OFF PROPOFOL AT THIS TIME FOR SEDATION VACATION/WEANING TRIAL. RT
SWITCHED PT FROM AC/PC TO SPONTANEOUS WITH PRESSURE SUPPORT OF 10/5; FIO2 45%.
RESP RATE 30-40'S. LOW VT ; HIGHEST 800. PT ABLE TO OPEN EYES TO VERBAL
STIMULI, FOLLOW COMMANDS, AND NOD HEAD YES/NO TO QUESTIONS. POSTIIVE GAG/COUGH
REFLEX. PT REMAINS TOO WEAK TO MOVE BUE'S; THEREFORE, RESTRAINTS HAVE REMAINED
OFF. ARTERIAL LINE AND CENTRAL LINE DRESSING TO RIGHT GROIN HAS BEEN CHANGED
THIS SHIFT. LEVOPHED IS CURRENTLY OFF. AMIODARONE INFUSING CONTINUOUSLY AT
0.5MCG/MIN. HEPARIN AT 18 UNITS/KG/HR. BICARB AT 50MLS/HR. TRIALYSIS CATH TO
RIGHT IJ WITH DRESSING INTACT. VHP INFUSING AT TRICKLE FEED RATE OF 10ML/HR;
NO RESIDUALS ON LAST ASSESSMENT; BOWEL TONES ARE HYPOACTIVE. KRAFT CATHETER IS
PATENT AND DRAINING MINIMAL AMOUNTS OF YELLOW URINE TO GRAVITY. WILL CONTINUE
TO MONITOR UNTIL REPORT IS HANDED OFF TO ONCOMING RN.

## 2022-01-03 NOTE — NUR
ASSESSMENT/ASSUMED CARE
PT INTUBATED AND ON [facility] VENT. PT AWAKE AND FOLLOWING INSTRUCTIONS. VENT
SETTINGS SPON 10/5 FIO2 40%. PT NODS YES IF ASKED IF TRIED AND WANT GO BACK ON
VENT. RT NOTIFIED AND CHANGED VENT SETTINGS TO AC/PC 28 PI 14 TI 1.0 PEEP 5
FIO2 40%. PT RESTARTED ON SEDATION PROPOFOL AT 25 MCQ/KG/MIN. LUNGS COARSE AND
DECREASED IN THE BASES. SUCTIONED SMALL AMT THICK TAN VIA ET TUBE. HEART RATE
IRREGULAR-AFIB 110-120'S. BP STABLE VIA CLEVE TO RIGHT FEMORAL. CLEVE ZEROED.
BT VERY HYPOACTIVE. ABD ROUND. OG WITH TUBE FEED VITAL HP AT 10 ML/HR, WATER
30 ML Q4HR. RESIDUAL 210 ML REFED. DIALYSIS CATH WITH IV PIGTAIL TO RIGHT IJ,
DRSG INTACT. AMIODARONE AT 0.5 MG/MIN VIA IV PIGTAIL. CENTRAL LINE TO RIGHT
GROIN WITH HEPARIN 18 UNITS/KG/HR DECREASED TO 17 UNITS/KG/HR PER PHARMACY,
1/2NS NA BICARB  ML/HR, NS AT 10 ML/HR, AND PROPOFOL AT 25 MCQ/KG/MIN.
CLEVE AND CENTRAL LINE TO RIGHT FEMORAL LEAKING SEROUS FLUID, WILL CHANGE
DRSG. PT MOVING FEET AND LEG BUT NOT MOVING HANDS. KRAFT CATH PATENT DRAINING
SMALL AMT YELLOW URINE. BILAT SOFT WRIST RESTRAINTS ON. COCCYX DRSG INTACT.

## 2022-01-03 NOTE — NUR
SHIFT SUMMARY....
 
NO ACUTE NEGAIVE CHANGES NOTED THIS SHIFT, THE PT HAS BEEN ON SPONTAINIOUS OF
10/5 AND 40% WITH O2 SATS >90% T/O THE SHFIT. THE PT HAS NOT BEEN ON SEDATION
AT ALL THIS SHIFT BUT HAS BEEN GETTING IV FENTANYL PUSHES PRN FOR PAIN. THE PT
HAS BEEN TURNED Q2 HRS THIS SHIFT. THE DRESSING TO THE PT'S COCCYX WAS CHANGED
THIS SHIFT AS WELL. THE PT'S KRAFT IS PATENT AND DRAINED 110MLS TO GRAVITY.
THE PT'S TUBE FEEDS ARE RUNNING AT 10MLS/HR WITH THE LAST RESIDUAL WAS 175MLS.
THE PT'S HEPARIN GTT IS RUNNING AT 18U/KG/HR PER ORDERS, AMIO GTT IS AT 0.5MG,
THE BICARB GTT WAS INCREASED FROM 50MLS/HR TO 100MLS/HR.
 
WILL CONTINUE TO MONITOR UNTIL REPORT IS GIVEN TO ONCOMING RN.

## 2022-01-03 NOTE — NUR
AM NOTE...
 
ASSUMED CARE OF PT AT 0700, THE PT IS INTUBATED, CURRENTLY THE PT IS ON
SPONTAIOUS AT 10/5 AND 45% WITH O2 SATS >90%, L/S COARSE T/O MORE SO ON THE
LEFT THAN THE RIGHT AND DIM IN THE BASES, THIS IS  A CHANGE FROM THE CLEAR/DIM
FROM YESTERDAY. THE PT IS CURRENTLY NOT ON ANY SEDATION, HE IS ABLE TO OPEN
HIS EYES AND FOLLOW COMMANDS. THERE IS A MODERATE AMOUNT OF THICK TAN
SECRETIONS SUCTIONED FROM THE ET TUBE DURING AM ASSESSMENT, THIS IS A CHANGE
FROM YESTERDAY AS WELL, SCANT TO NONE SECRETIONS WERE NOTED YESTERDAY. THE PT
IS IN AFIB IN 'S-120'S BP STABLE, ART LINE IN THE RIGHT GROIN IS
SHOWING MAPS >65. BLE PULSES FOUND BY DOPPLER, THE PT'S BLE ARE WARM AND PINK
THIS AM, YESTERDAY THEY WERE COOL AND CYANNOTIC. 2+ PITTING EDEMA NOTED TO THE
BLE, DEPENDENT EDEMA NOTED TO THE BUE, TORSO AND SCROTUM/PENIS. BT PRESENT AND
HYPOACTIVE, ABD HAS MILD DISTENTION, IS SOFT BUT TENDER TO PALP. OG TUBE IS
RUNNING TUBE FEEDS AT 10MLS/HR WITH Q4 H2O FLUSHES OF 30MLS. 100MLS OF
RESIDUALS NOTED AND REINSTILLED. TEMP KRAFT IS PATENT AND DRAINING SCANT
AMOUNT OF URINE TO GRAVITY.
 
PLAN IS TO HAVE THE PT GET DIALYSIS THIS AM.
 
WILL CONTIUE TO MONITOR.

## 2022-01-04 LAB
ALBUMIN SERPL BCP-MCNC: 1.6 G/DL (ref 3.4–5)
ANION GAP SERPL CALCULATED.4IONS-SCNC: 15 MMOL/L (ref 6–16)
BASOPHILS # BLD: 0 K/MM3 (ref 0–0.23)
BASOPHILS NFR BLD: 0 % (ref 0–2)
BUN SERPL-MCNC: 127 MG/DL (ref 8–24)
CALCIUM SERPL-MCNC: 7.1 MG/DL (ref 8.5–10.1)
CHLORIDE SERPL-SCNC: 95 MMOL/L (ref 98–108)
CO2 SERPL-SCNC: 22 MMOL/L (ref 21–32)
CREAT SERPL-MCNC: 7.19 MG/DL (ref 0.6–1.2)
DEPRECATED RDW RBC AUTO: 43.3 FL (ref 35.1–46.3)
EOSINOPHIL # BLD: 0 K/MM3 (ref 0–0.68)
EOSINOPHIL NFR BLD: 0 % (ref 0–6)
ERYTHROCYTE [DISTWIDTH] IN BLOOD BY AUTOMATED COUNT: 15.7 % (ref 11.7–14.2)
GLUCOSE SERPL-MCNC: 220 MG/DL (ref 70–99)
HCT VFR BLD AUTO: 39.8 % (ref 37–53)
HGB BLD-MCNC: 13.8 G/DL (ref 13.5–17.5)
LYMPHOCYTES # BLD: 0.73 K/MM3 (ref 0.84–5.2)
LYMPHOCYTES NFR BLD: 4 % (ref 21–46)
MAGNESIUM SERPL-MCNC: 2.7 MG/DL (ref 1.6–2.4)
MCHC RBC AUTO-ENTMCNC: 34.7 G/DL (ref 31.5–36.5)
MCV RBC AUTO: 79 FL (ref 80–100)
METAMYELOCYTES # BLD MANUAL: 0.18 K/MM3 (ref 0–0)
METAMYELOCYTES NFR BLD MANUAL: 1 % (ref 0–0)
MONOCYTES # BLD: 1.84 K/MM3 (ref 0.16–1.47)
MONOCYTES NFR BLD: 10 % (ref 4–13)
MYELOCYTES # BLD MANUAL: 0.36 K/MM3 (ref 0–0)
MYELOCYTES NFR BLD MANUAL: 2 % (ref 0–0)
NEUTS BAND NFR BLD MANUAL: 4 % (ref 0–8)
NEUTS SEG # BLD MANUAL: 15.31 K/MM3 (ref 1.96–9.15)
NEUTS SEG NFR BLD MANUAL: 79 % (ref 41–73)
NRBC # BLD AUTO: 0.07 K/MM3 (ref 0–0.02)
NRBC BLD AUTO-RTO: 0.4 /100 WBC (ref 0–0.2)
PHOSPHATE SERPL-MCNC: 9.4 MG/DL (ref 2.5–4.9)
PLATELET # BLD AUTO: 144 K/MM3 (ref 150–400)
POTASSIUM SERPL-SCNC: 6 MMOL/L (ref 3.5–5.5)
SODIUM SERPL-SCNC: 132 MMOL/L (ref 136–145)
TOTAL CELLS COUNTED BLD: 100

## 2022-01-04 NOTE — NUR
SHIFT SUMMARY
PT REMAINS INTUBATED. SEDATION TURNED OFF AT 1518 AND PT IS OPENING HIS EYES,
WILL WIGGLE HIS TOES WHEN ASKED, BUT IS NOT MOVING HIS UPPER EXTREMITIES WHEN
ASKED. HIS LUNGS ARE CLEAR. REMAINS AFIB, BP STABLE. DOBUTAMINE STARTED AT SET
RATE PER DR. VILLARREAL'S ORDERS. PT HAVING LIQUID BLACK STOOLS, RECTAL TUBE PLACED
THIS EVENING AS PT HAS A SORE ON HIS COCCYX AND HE IS NOW RECEIVING KAYEXALATE
TO HELP HIS POTASSIUM. PT IS TOLERATING HIS TUBE FEED WITH 10-20ML OF RESIDUAL
ON EACH CHECK. TUBE FEED SWITCHED TO NEPRO PER DIETARY AND RATE INCREASED BACK
TO 25ML/HR SINCE HE HAS HAD MINIMAL RESIDUALS TODAY. KRAFT PUT 35ML OF URINE
THROUGHOUT THE SHIFT. REMAISN EDEMATOUS, ELEVATING EXTREMITIES AS ABLE. PT'S
SISTER VISITED THIS AFTERNOON AND SHE WAS UPDATED ON PT'S CARE. CONTINUING TO
MONITOR.

## 2022-01-04 NOTE — NUR
REASSESSMENT
PT REMAINS INTUBATED AND SEDATED. HE RECEIVED DIALYSIS THIS MORNING AND
TOLERATED IT WELL. LUNGS ARE CLEAR CURRENTLY. REMAINS AFIB, BP STABLE.
GENERALIZED 2+ EDEMA, WEEPING FROM ANY BREAK IN THE SKIN. 2 BMS THIS AM,
DRESSING OVER COCCYX WOUND CHANGED EACH TIME. KRAFT WITH SCANT AMT OF URINE.
SPOKE WITH PT'S DAUGHTER OVER TELEPHONE AND PROVIDED UPDATE THIS MORNING.

## 2022-01-04 NOTE — NUR
SHIFT SUMMARY
PT CONT INTUBATED AND ON [facility] VENT. PT CHANGED FROM SPONT TO AC/PC WHEN PT
REPORTED THAT HE WAS TIRED AND WANTED TO GO BACK ON THE VENT. VENT SETTINGS
AC/PC 28 PI 14 TI 1.0 PEEP 5 FIO2 40%. SUCTIONED SMALL AMT VIA ET TUBE. HEART
RATE DOWN TO THE 'S. BP STABLE VIA CLEVE. CLEVE/CENTRAL LINE CHANGED. PT
TURNED Q2HR. HEPARIN GTT DECREASED DURING THE NIGHT CURRENTLY AT 16
UNITS/KG/HR. NEXT PTT AT 1000. DR BUSTOS NOTIFIED REGARDING CRITICAL LABS. PT
TO HAVE DIALYSIS TODAY. DR BUSTOS INTO SEE PT. DIALYSIS CATH TO RIGHT IJ DRSG
CHANGED. TUBE FEED AT 10 ML/HR. REPORT TO ON COMING NURSE

## 2022-01-04 NOTE — NUR
PATIENT INTUBATED AWAKE OCCASIONALLY SHACKING HEAD BACK ON FORTH, WHEN ASKED
IF HE WAS ANXIOUS AND WANTING HELP SLEEPING PATIENT NODDING YES, PROPOFOL
RESTARTED AT 25 MCG. ETT IN PLACE WITH VENT AC PC RATE 28 IP 14 PEEP 5 FIO2
40% SUCTIONING THICK TAN PLUGS VIA ETT. CLEAR ORAL SECRETIONS. DOBUTAMINE
DRIP 2.5, AND AMIODARONE 0.5, SODIUM BICARB DRIP 100 CC/HR, HEPARIN DRIP 15
UNITS PER PHARMACY. OG IN PLACE WITH TUBE FEEDING AT GOAL RATE. RECTAL TUBE IN
PLACE DRAINING BLACK LIQUID STOOL.

## 2022-01-05 LAB
ALBUMIN SERPL BCP-MCNC: 1.5 G/DL (ref 3.4–5)
ANION GAP SERPL CALCULATED.4IONS-SCNC: 17 MMOL/L (ref 6–16)
BASOPHILS # BLD: 0 K/MM3 (ref 0–0.23)
BASOPHILS NFR BLD: 0 % (ref 0–2)
BUN SERPL-MCNC: 141 MG/DL (ref 8–24)
CALCIUM SERPL-MCNC: 6.5 MG/DL (ref 8.5–10.1)
CHLORIDE SERPL-SCNC: 94 MMOL/L (ref 98–108)
CO2 SERPL-SCNC: 23 MMOL/L (ref 21–32)
CREAT SERPL-MCNC: 7.07 MG/DL (ref 0.6–1.2)
DEPRECATED RDW RBC AUTO: 41.7 FL (ref 35.1–46.3)
EOSINOPHIL # BLD: 0 K/MM3 (ref 0–0.68)
EOSINOPHIL NFR BLD: 0 % (ref 0–6)
ERYTHROCYTE [DISTWIDTH] IN BLOOD BY AUTOMATED COUNT: 15.8 % (ref 11.7–14.2)
GLUCOSE SERPL-MCNC: 251 MG/DL (ref 70–99)
HCT VFR BLD AUTO: 38.7 % (ref 37–53)
HGB BLD-MCNC: 13.8 G/DL (ref 13.5–17.5)
MAGNESIUM SERPL-MCNC: 2.8 MG/DL (ref 1.6–2.4)
MCHC RBC AUTO-ENTMCNC: 35.7 G/DL (ref 31.5–36.5)
MCV RBC AUTO: 77 FL (ref 80–100)
MONOCYTES # BLD: 0.61 K/MM3 (ref 0.16–1.47)
MONOCYTES NFR BLD: 3 % (ref 4–13)
MYELOCYTES # BLD MANUAL: 0.61 K/MM3 (ref 0–0)
MYELOCYTES NFR BLD MANUAL: 3 % (ref 0–0)
NEUTS BAND NFR BLD MANUAL: 1 % (ref 0–8)
NEUTS SEG # BLD MANUAL: 19.41 K/MM3 (ref 1.96–9.15)
NEUTS SEG NFR BLD MANUAL: 93 % (ref 41–73)
NRBC # BLD AUTO: 0.05 K/MM3 (ref 0–0.02)
NRBC BLD AUTO-RTO: 0.2 /100 WBC (ref 0–0.2)
PHOSPHATE SERPL-MCNC: 8.9 MG/DL (ref 2.5–4.9)
PLATELET # BLD AUTO: 127 K/MM3 (ref 150–400)
POTASSIUM SERPL-SCNC: 5.3 MMOL/L (ref 3.5–5.5)
SODIUM SERPL-SCNC: 134 MMOL/L (ref 136–145)
TOTAL CELLS COUNTED BLD: 100

## 2022-01-05 NOTE — NUR
SUMMARY
PATIENT REMAINS INTUBATED AND SEDATED WITH PROPOFOL 25 MCG AWAKENS EASILY,
GENERALIZED WEAKNESS, NODDING YES AND NO TO QUESTIONS. SLIGHT MOVEMENT TO
EXTREMITIES. ETT IN PALACE WITH VENT AC PC RATE 28, IP 14, PEEP 5, FIO2 40%
SUCTIONING LARGE AMT OF THICK TAN SPUTUM T/O NIGHT. OG REMAINS IN PLACE WITH
NEPRO AT GOAL RATE OF 25 CC/HR WITH MIN RESIDUALS T/O NIGHT. RECTAL TUBE IN
PLACE DRAINING BLACK/GREEN LIQUID STOOL. DRESSING TO HD DIALYSIS LINE AND
CENTRAL LINE ART LINE CHANGED DURING THE NIGHT. AMIODARONE, AND DOBUTAMINE
DRIPS CONTINUE. HEPARIN DRIP PER PHARMACY. DOCTOR BUSTOS IN TO SEE PATIENT AND
AWARE OF THIS AM LABS.

## 2022-01-05 NOTE — NUR
SHIFT SUMMARY
PT. REMAINS SEDATED AND INTUBATED. MINIMAL URINE OUPUT THIS SHIFT, PT DID NOT
HAVE DIALYSIS TODAY. PT. REMAINS ON DOBUTAMINE AT SET RATE. PT CONITNUES WITH
LIQUID STOOL IN RECTAL TUBE. TOLERATING TF WITH MINIMAL RESIDUALS. VSS, REPORT
TO ONCOMING RN.

## 2022-01-05 NOTE — NUR
ASSUMED CARE AT 1900
PATIENT IS INTUBATED AND SEDATED ON PROPOFOL. MOVES HEAD SIDE TO SIDE, NO EYE
OPENING OR MOVEMENT OF EXTREMETIES AT THIS TIME. 02 SATS 94% ON VENT AC PC
RATE 28, PI 14, PEEP 5, FI02 40%. LUNGS COARSE TO DIMINISHED. HR A.FIB
90s-120, AMIO INF CONTINUOUS. BP STABLE, ART LINE IN PLACE. CONTINUOUS TUBE
FEED OF NEPRO AT GOAL RATE OF 25MLS/HR WITH Q4 HOURS FLUSHES OF 30 MLS. 5 MLS
RISIDUAL. KRAFT WITH MINIMAL OUTPUT. HEPARIN INF. DEEP PITTING/WEEPING EDEMA
T/O. REPOSITIONED AND WRIST RESTRAINTS IN PLACE TO PROTECT LINES AND TUBES.
SEE SHIFT ASSESSMENT FOR MORE DETAIL.

## 2022-01-05 NOTE — NUR
REASSESSMENT
PT. REPOSITIONED HIGH UP ON RIGHT SIDE. DRESSING REPLACED TO BUTTOCKS, RECTAL
TUBE REPOSITIONED. PT. CONTINUES WITH LIQUID STOOL. DRY FLOW REPLACED. PT.
SCROTUM PLACED IN SLING WITH PILLOW CASE DUE TO SWELLING. DRESSING REPLACED TO
ART LINE/ CENTRAL LINE IN RIGHT GROIN.

## 2022-01-05 NOTE — NUR
ASSUMED CARE
PT. REMAINS SEDATED AND INTUBATED, PROPOFOL INFUSING AT 25MCG/KG/MIN.  PT.
VENT SETTINGS CURRENTLY AC/PC SP 14, PEEP 5, 40% FIO2 WITH RATE 28. PT
CURRENTLY HAS TVS OF 500S, WITH RATE OF 29 AT THIS TIME. PT. HAS THICK TAN
SECRETIONS FROM ETT. PT. HAS COARSE LS T/O. HR 90S, REMAINS ON AMIO GTT AT
0.5MG/MIN AND DOBUTAMINE AT 2.5MCG/KG/MIN, AND HEPARIN GTT INFUSING AT 15U/HR
PT CONTINUES WITH ARTLINE IN PLACE TO RIGHT GROIN. ZEROED THIS AM. BP WNL. PT.
HAS OG TUBE IN PLACE INFUSING NEPRO GTT AT 25ML/HR, RESIDUAL OF 10ML THIS AM.
RECTAL TUBE IN PLACE DRAINING LIQUID GREEN/BLACK STOOL. PT. HAS KRAFT TEMP
PROBE DRAINING MINIMAL AMOUNT OF CLEAR YELLOW URINE TO GRAVITY. SCROTUM VERY
SWOLLEN, WITH 3+ EDEMA T/O EXTREM.  NO PLANS FOR DIALYSIS THIS AM PER DIALYSIS
RN.BILAT WRIST RESTRAINTS IN PLACE.

## 2022-01-06 LAB
ALBUMIN SERPL BCP-MCNC: 1.4 G/DL (ref 3.4–5)
ALBUMIN/GLOB SERPL: 0.4 {RATIO} (ref 0.8–1.8)
ALT SERPL W P-5'-P-CCNC: 616 U/L (ref 12–78)
ANION GAP SERPL CALCULATED.4IONS-SCNC: 21 MMOL/L (ref 6–16)
AST SERPL W P-5'-P-CCNC: 158 U/L (ref 12–37)
BASOPHILS # BLD: 0 K/MM3 (ref 0–0.23)
BASOPHILS NFR BLD: 0 % (ref 0–2)
BILIRUB DIRECT SERPL-MCNC: 11.1 MG/DL (ref 0–0.3)
BILIRUB INDIRECT SERPL-MCNC: 1.7 MG/DL (ref 0.1–0.7)
BILIRUB SERPL-MCNC: 12.8 MG/DL (ref 0.1–1)
BUN SERPL-MCNC: 171 MG/DL (ref 8–24)
CALCIUM SERPL-MCNC: 6.2 MG/DL (ref 8.5–10.1)
CHLORIDE SERPL-SCNC: 90 MMOL/L (ref 98–108)
CO2 SERPL-SCNC: 21 MMOL/L (ref 21–32)
CREAT SERPL-MCNC: 8.52 MG/DL (ref 0.6–1.2)
DEPRECATED RDW RBC AUTO: 42.3 FL (ref 35.1–46.3)
EOSINOPHIL # BLD: 0.24 K/MM3 (ref 0–0.68)
EOSINOPHIL NFR BLD: 1 % (ref 0–6)
ERYTHROCYTE [DISTWIDTH] IN BLOOD BY AUTOMATED COUNT: 16.1 % (ref 11.7–14.2)
GLOBULIN SER CALC-MCNC: 3.8 G/DL (ref 2.2–4)
GLUCOSE SERPL-MCNC: 225 MG/DL (ref 70–99)
HCT VFR BLD AUTO: 39.5 % (ref 37–53)
HGB BLD-MCNC: 13.9 G/DL (ref 13.5–17.5)
LYMPHOCYTES # BLD: 0.24 K/MM3 (ref 0.84–5.2)
LYMPHOCYTES NFR BLD: 1 % (ref 21–46)
MAGNESIUM SERPL-MCNC: 3.2 MG/DL (ref 1.6–2.4)
MCHC RBC AUTO-ENTMCNC: 35.2 G/DL (ref 31.5–36.5)
MCV RBC AUTO: 78 FL (ref 80–100)
MONOCYTES # BLD: 0.96 K/MM3 (ref 0.16–1.47)
MONOCYTES NFR BLD: 4 % (ref 4–13)
MYELOCYTES # BLD MANUAL: 1.2 K/MM3 (ref 0–0)
MYELOCYTES NFR BLD MANUAL: 5 % (ref 0–0)
NEUTS BAND NFR BLD MANUAL: 3 % (ref 0–8)
NEUTS SEG # BLD MANUAL: 21.45 K/MM3 (ref 1.96–9.15)
NEUTS SEG NFR BLD MANUAL: 86 % (ref 41–73)
NRBC # BLD AUTO: 0.04 K/MM3 (ref 0–0.02)
NRBC BLD AUTO-RTO: 0.2 /100 WBC (ref 0–0.2)
PHOSPHATE SERPL-MCNC: 10.6 MG/DL (ref 2.5–4.9)
PLATELET # BLD AUTO: 125 K/MM3 (ref 150–400)
POTASSIUM SERPL-SCNC: 5 MMOL/L (ref 3.5–5.5)
PROT SERPL-MCNC: 5.2 G/DL (ref 6.4–8.2)
SODIUM SERPL-SCNC: 132 MMOL/L (ref 136–145)
TOTAL CELLS COUNTED BLD: 100

## 2022-01-06 NOTE — NUR
SHIFT SUMMARY....
 
NO ACUTE NEGATIVE CHANGES NOTED THIS SHIFT, THE PT'S HEPARIN GTT WAS CHANGED
FROM 14U/KG/HR TO 11U/KG/HR D/T A CRITIALLY HIGH PTT .5, THE HEPARIN WAS
STOPPED PER PHARMACY AND RESTARTED AT 1545. A PICC LINE WAS PLACED IN THE PT'S
JM AND PLACEMENT VERIFIED BY XRAY. THE PT'S VENT SETTINGS ARE THE SAME. THE
PT'S KRAFT IS PATENT AND DRAINED 25MLS OF DARK YELLLOW URINE THIS SHIFT. THE
PT'S RECTAL TUBE IS PATENT AND DRAINED 200MLS OF BLACK LIQUID STOOLS,  THE BAG
WAS MARKED BY THIS RN AT 1830. THE PT'S SCROTUM SPLIT ACROSS THE FRONT APROX 3
CM, A DRESSING WAS PLACED BY THIS RN. THE PT HAS HAD SCANT SECRETIONS
SUCTIONED VIA THE ET TUBE THIS SHIFT. DURING THE SEDATION VACATION THE PT WAS
ABLE TO FOLLOW COMMANDS.
 
WILL CONTINUE TO MONITOR UNTIL REPORT IS GIVEN TO ONCOMING RN.

## 2022-01-06 NOTE — NUR
SHIFT SUMMARY
PATIENT REMAINS INTUBATED AND SEDATED ON PROPOFOL. MOVING HEAD BUT NO EYE
OPENING OR MOVEMENT OF EXTREMETIES. BILATERAL SOFT WRIST RESTRAINTS IN PLACE
TO PROTECT LINES/TUBES. VENT SETTING UNCHANGED. LUNGS COARSE TO DIMINISHED
WITH SMALL AMOUNT OF TAN SPUTUM VIA SUCTION. HR A.FIB 90s-120.  REMAINS
STABLE. HEPARIN, DOBUTAMINE, AND AMIO INF. TITRATED HEPARIN DOWN PER PHARMACY.
KRAFT DRAINING TO GRAVITY, 75 MLS OUTPUT, CLEAR YELLOW WITH SEDIMENT. RECTAL
TUBE  MLS OUT OF LIQUID BROWN/BLACK STOOL. TUBE FEED INF NEPRO AT GOAL
RATE 25 MLS/HR, 30 MLS FLUSHES Q4H. RISIDUALS 5-20 MLS REINSTILLED. COMPLETE
BATH DONE AND DRESSING CHANGED ON CENTRAL LINE AND ART LINE. REPSOTIONED Q2
HOURS.

## 2022-01-06 NOTE — NUR
AM NOTE....
 
ASSUMED CARE OF PT AT 0700, THE PT IS IN INTUBATED AND SEDATED ON PROPOFOL
RUNNING AT 25MCG. THE PT'S ET TUBE IS 8.0 AND 27 AND THE TEETH. VENT SETTINGS
ARE AC/PC:28/14/5/40% WITH O2 SATS >90% L/S COARSE T/O DIM IN THE BASES. A
SMALL AMOUNT OF THICK TAN SECRETIONS WERE SUCTIONED VIA THE ET TUBE.  THE PT
IS IN AFIB IN THE 80'S-110'S. THE PT HAS 2+ PITTING EDEMA TO HIS BLE,
DEPENDENT EDEMA TO HIS SCROTUM AND PENIS, THERE ARE SMALL, WHEEPING BLISTERS
NOTED TO THE SCROTUM. GENERALIZED EDEMA NOTED TO HIS TORSO AND BUE. BT PRESENT
AND HYPOACTIVE, ABD IS SOFT TO PALP. OG TUBE IS RUNNING TUBE FEED NEPRO TO
GOAL OF 25MLS/HR WITH 150MLS OF RESIDUAL RE-INSTILLED THIS AM. RECTAL TUBE IN
PLACE AND PATENT DRAINING DARK BROWN/BLACK LIQUID STOOLS TO GRAVITY. TEMP
KRAFT IS PATENT AND DRAINING TO GRAVITY, THE PT'S TEMP IS 97.9 AT THIS TIME.
 
A SEDATION VACATION THIS AM WAS DONE, THE PT WAS ABLE TO OPEN HIS EYES AND
FOLLOW COMMANDS.
 
WILL CONTINUE TO MONITOR.

## 2022-01-07 LAB
ALBUMIN SERPL BCP-MCNC: 1.4 G/DL (ref 3.4–5)
ANION GAP SERPL CALCULATED.4IONS-SCNC: 18 MMOL/L (ref 6–16)
BASOPHILS # BLD: 0 K/MM3 (ref 0–0.23)
BASOPHILS NFR BLD: 0 % (ref 0–2)
BUN SERPL-MCNC: 144 MG/DL (ref 8–24)
C DIFF TOX GENS STL QL NAA+PROBE: NEGATIVE
CALCIUM SERPL-MCNC: 6.2 MG/DL (ref 8.5–10.1)
CHLORIDE SERPL-SCNC: 93 MMOL/L (ref 98–108)
CO2 SERPL-SCNC: 22 MMOL/L (ref 21–32)
CREAT SERPL-MCNC: 7.69 MG/DL (ref 0.6–1.2)
DEPRECATED RDW RBC AUTO: 44.7 FL (ref 35.1–46.3)
EOSINOPHIL # BLD: 0.57 K/MM3 (ref 0–0.68)
EOSINOPHIL NFR BLD: 2 % (ref 0–6)
ERYTHROCYTE [DISTWIDTH] IN BLOOD BY AUTOMATED COUNT: 16.7 % (ref 11.7–14.2)
GLUCOSE SERPL-MCNC: 179 MG/DL (ref 70–99)
HCT VFR BLD AUTO: 39.8 % (ref 37–53)
HGB BLD-MCNC: 13.7 G/DL (ref 13.5–17.5)
LYMPHOCYTES # BLD: 0.57 K/MM3 (ref 0.84–5.2)
LYMPHOCYTES NFR BLD: 2 % (ref 21–46)
MCHC RBC AUTO-ENTMCNC: 34.4 G/DL (ref 31.5–36.5)
MCV RBC AUTO: 79 FL (ref 80–100)
MONOCYTES # BLD: 0.28 K/MM3 (ref 0.16–1.47)
MONOCYTES NFR BLD: 1 % (ref 4–13)
MYELOCYTES # BLD MANUAL: 1.14 K/MM3 (ref 0–0)
MYELOCYTES NFR BLD MANUAL: 4 % (ref 0–0)
NEUTS BAND NFR BLD MANUAL: 5 % (ref 0–8)
NEUTS SEG # BLD MANUAL: 26.12 K/MM3 (ref 1.96–9.15)
NEUTS SEG NFR BLD MANUAL: 86 % (ref 41–73)
NRBC # BLD AUTO: 0.04 K/MM3 (ref 0–0.02)
NRBC BLD AUTO-RTO: 0.1 /100 WBC (ref 0–0.2)
PHOSPHATE SERPL-MCNC: 10.4 MG/DL (ref 2.5–4.9)
PLATELET # BLD AUTO: 103 K/MM3 (ref 150–400)
POTASSIUM SERPL-SCNC: 4.8 MMOL/L (ref 3.5–5.5)
SODIUM SERPL-SCNC: 133 MMOL/L (ref 136–145)
TOTAL CELLS COUNTED BLD: 100

## 2022-01-07 NOTE — NUR
AM NOTE....
 
ASSUMED CARE OF PT AT 0700, THE PT IS INTUBATED AND SEDATED ON PROPOFOL AT
35MCG, THE VENT SETTINGS ARE AC/PC:28/14/5/40% WITH O2 SATS >90%. L/S COARSE
T/O DIM IN THE BASES, SMALL AMOUNT OF THICK TAN SECRETIONS NOTED WITH
SUCTIONING THE ET TUBE. THE PT HAS SMALL AMOUNT OF THICK CREAM COLOR ORAL
SECRETIONS. THE PT IS IN AFIB IN THE 80'S-90'S BP STABLE WITH READING FROM THE
ART LINE, THE ART LINE WAS ZEROED AT THE START OF THIS SHIFT. THE PT HAS 2+
PITTING EDEMA TO HIS BLE, DEPENDENT EDEMA TO HIS BUE AND TORSO, THE PT'S
SCROTUM AND PENIS CONTINUE TO BE VERY EDEMATOUS, A DRESSING IS NOTED ACROSS
THE FRONT OF THE PT'S SCROTUM WHERE THE SKIN HAD SPLIT LAST NIGHT. BT PRESENT
AND HYPOACTIVE, ABD IS SOFT AND THE PT GRIMACES WITH PALPATION. A RECTAL TUBE
IS IN PLACE DRAINING DARK BROWN LIQUID STOOLS. THE PT'S OG TUBE IS RUNNING
NEPRO TUBE FEES AT 25MLS/HR PER ORDERS, RESIDUALS WERE 15MLS AND REINSTILLED
THIS AM. THE PT'S KRAFT IS PATENT AND DRAINING DARK YELLOW URINE TO GRAVITY.
THE ART LINE AND CENTRAL LINE DRESSING IN THE RIGHT GROIN ARE SATURATED WITH
SEROUS DRAINAGE AND STARTING TO PEEL UP AROUND THE EDGES.
 
THE PT WAS STARTED ON A SEDATION VACATION AT THE START OF THIS SHIFT. THE PT
WOKE EASILY, WAS ALERT AND FOLLOWING DIRECTIONS. THE PT'S VENT WAS SWITCHED
FROM AC/PC: 28/14/5/40% TO SPONTAINOUS PRESSURE SUPPORT OF 7/5 AND 40%, THE
PT'S O2 SATS CONTINUE TO BE >90%, THE PT IS CALM AND COOPERATIVE WITH CARE.
 
WILL CONTINUE TO MONITOR.

## 2022-01-07 NOTE — NUR
PT UPDATE...
 
DR. WAYNE AT THE BEDSIDE TO ASSESS THE PT. PER DR. WAYNE WE ARE STOPPING THE
AMIO GTT, THE DOBUTAMINE GTT AND PUTTING THE HEPARIN GTT ON HOLD UNTIL THE
ART LINE IS PULLED AND THE SITE IS STABLE.
 
WILL CONTINUE TO MONITOR.

## 2022-01-07 NOTE — NUR
PT UPDATE....
 
THIS RN SPOKE WITH DR. WAYNE ABOUT D/Cing THE ART LINE AND RIGHT GROIN CENTRAL
LINE, PER DR. WAYNE THE HEPARIN GTT IS TO BE STOPPED 2 HRS PRIOR TO PULLING THE
LINES, HELD FOR 2 HRS AFTER THE LINES ARE PULLED AND THEN RESTARTED. THE
PHARMACY WAS NOTIFIED OF THIS PLAN.
 
WILL CONTINUE TO MONITOR.

## 2022-01-07 NOTE — NUR
PT UPDATE....
 
THE PT WAS SWITCHED BACK FROM SPONTAINIOUS TO AC/PC D/T THE LINE REMOVAL,
PROPOFOL WAS TURNED BACK ON AT 25MCG AND THE PT WAS GIVEN IV FENTANYL.
AT 1205 THE PT'S ART LINE AND CENTRAL LINE IN THE RIGHT GROIN WERE REMOVED,
PRESSURE WAS HELD FOR 20 MINS BY THIS RN, WHEN PRESSURE WAS REMOVED THE SITE
STARTED TO BLEED, PRESSURE WAS HELD AGAIN FOR ANOTHER 12 MINS, AFTER THIS
PRESSURE WAS REMOVED AND THE SITE WAS STABLE, NO SWELLING, BLEEDING OR
HEMATOMA WAS NOTED TO THE SITE. THE DOBUTAMIN GTT WAS RESTARTED D/T SOFT
BLOOD PRESSURES PER DR. WAYNE.
 
WILL CONTINUE TO MONITOR.

## 2022-01-07 NOTE — NUR
PT UPDATE....
 
PER DR. WAYNE, HOLD THE HEPARIN DRIP UNTIL TOMORROW WHEN HE CAN RE-ASSESS THE
PT D/T THE EXTENDED TIME IT TOOK TO STABALIZE THE PT'S RIGHT GROIN AFTER THE
LINES WERE REMOVED. PHARMACY UPDATED TO THIS PLAN.
 
WILL CONTINUE TO MONITOR.

## 2022-01-07 NOTE — NUR
ASSUMED CARE AT 1900
PT LAYING IN BED INTUBATED WITH VENT SETTINGS PC 5/5, FIO2 40%, -700;
SMALL AMOUNT OF THIN YELLOW SECREATIONS. PT ABLE TO WEAKLY ANSWER Y/N
QUESTIONS WITH HEAD NOD; NO PROPOFOL INFUSING; WEAKLY MOVES TOES ON COMMAND
BUT DOES NOT MOVE ARMS. AFEBRILE. AFIB W/ HR 80-90'S. SBP . VERY DEEP
SCROTAL EDEMA, GENERAL MODERATE-DEEP EDEMA NOTED. NEPRO INFUSING VIA OG AT
20ML/HR (GOAL); MINIMAL RESIDUALS NOTED. RECTAL TUBE IN PLACE AND DRAINING TO
GRAVITY. KRAFT IN PLACE AND DRAINING MINIMAL URINE. LARGE AMOUNT OF SEROUS
DRAINAGE NOTED FROM PICC LINE TO JM AND RT GROIN FROM ART LINE AND CENTRAL
LINE BEING PULLED EARLIER TODAY. PICC LINE DRESSING CHANGED DUE TO SATURATION.
RT GROIN SHOWS NO SIGNS OF HEMATOMA, JUST SEROUS DRAINAGE; ABD DRESSING
APPLIED AND CHANGED TWICE. TRIALYSIS CATH TO RT IJ DRESSING WNL. SEE SHIFT
ASSESSMENT FOR FULL ASSESSMENT.

## 2022-01-07 NOTE — NUR
SHIFT SUMMARY
 
NO SIGNIFICANT CHANGES DURING THE NIGHT. HEPARIN GTT CHANGED TO 12U/KG/HR.
PROPOFOL @ 30MCG/KG/MIN, DOBUTAMINE @ 2.5MCG/KG/MIN, AMIO @ 0.5MG/MIN. VENT
SETTINGS: AC/PC 28/14/5/40% c O2 SATS >90%. SMALL AMOUNT OF THICK, TAN
SECRETIONS SUCTIONED AT THE START OF SHIFT, SCANT SECRETIONS T/O REST OF
NIGHT.  TF CONTINUES AT GOAL, LESS THAN 30CC RESIDUAL. RECTAL TUBE c ~100ML
DARK STOOL. TEMP PROBE KRAFT WITH <50CC DARK DEANA URINE OUT. PT ABLE TO
FOLLOW SIMPLE COMMANDS THIS AM DURING SEDATION VACATION, BACK ON SEDATION NOW.
ART LINE REMAINS IN PLACE, DRESSING CHANGED MULTIPLE TIMES DUE TO CONTINUOUS
WEEPING. WILL CONTINUE TO MONITOR, REPORT TO ONCOMING NURSE.

## 2022-01-07 NOTE — NUR
PT UPDATE....
 
PT'S TUBE FEED RATE WAS CHANGED FROM 25MLS/HR TO 20MLS/HR WITH A 24HR TOTAL
OF 480MLS TO BE GIVEN. RESIDUAL CHECKS SO FAR THIS SHIFT HAVE BEEN 10MLS.
 
WILL CONTINUE TO MONITOR.

## 2022-01-07 NOTE — NUR
END OF SHIFT SUMMARY
NO SIGNIFICANT CHANGES THIS SHIFT. VENT SETTINGS REMAIN UNCHANGED FROM START
OF SHIFT. PROPOFOL REMAINS AT 20MCG/KG/MIN WITH UNCHANGED NEURO ASSESSMENT.
LEVOPHED AT 16 MCG/MIN, VASOPRESSIN AT 0.04 UNITS/MIN, HEPARIN AT 20
UNITS/KG/HR, AMIODARONE AT 0.5 MCG/MIN, BICARB AT 50ML/HR, AND NS TKO.
DRESSING TO CENTRAL LINE AND ART LINE AT RIGHT GROIN SITE HAS BEEN CHANGED D/T
SATURATION OF LEAKING SEROUS FLUID. PT REMAINS VERY EDEMATOUS WITH SCROTAL AND
PENILE EDEMA CURRENTLY THE WORST. PT CONVERTED BACK TO NSR-SINUS TACHYCARDIA
AROUND 2330 WITH RATE . KRAFT CATHETER REMAINS PATENT AND DRAINING
MINIMAL AMOUNTS OF URINE TO GRAVITY. TMAX 100.9 THIS SHIFT. VSS, SEE
FLOWSHEET. WOUND TO COCCYX/SACRUM APPEARS TO BE WORSE TODAY; THEREFORE,
REMOVED HYDROCOLLOID DRESSING AND APPLIED MEPITEL TO WOUND BED AND COVERED
WITH FOAM/PROTECTIVE DRESSING. WILL UPDATE NURSING ORDERS TO CHANGE DAILY.
WILL CONTINUE TO MONITOR UNTIL REPORT IS HANDED OFF TO ONCOMING RN. Initiate Treatment: Accutane Render In Strict Bullet Format?: No Detail Level: Zone

## 2022-01-08 LAB
ALBUMIN SERPL BCP-MCNC: 1.4 G/DL (ref 3.4–5)
ALBUMIN/GLOB SERPL: 0.3 {RATIO} (ref 0.8–1.8)
ALT SERPL W P-5'-P-CCNC: 371 U/L (ref 12–78)
ANION GAP SERPL CALCULATED.4IONS-SCNC: 22 MMOL/L (ref 6–16)
AST SERPL W P-5'-P-CCNC: 156 U/L (ref 12–37)
BASOPHILS # BLD: 0 K/MM3 (ref 0–0.23)
BASOPHILS NFR BLD: 0 % (ref 0–2)
BILIRUB DIRECT SERPL-MCNC: 13.4 MG/DL (ref 0–0.3)
BILIRUB INDIRECT SERPL-MCNC: 2 MG/DL (ref 0.1–0.7)
BILIRUB SERPL-MCNC: 15.4 MG/DL (ref 0.1–1)
BUN SERPL-MCNC: 179 MG/DL (ref 8–24)
CALCIUM SERPL-MCNC: 6 MG/DL (ref 8.5–10.1)
CHLORIDE SERPL-SCNC: 90 MMOL/L (ref 98–108)
CO2 SERPL-SCNC: 20 MMOL/L (ref 21–32)
CREAT SERPL-MCNC: 9 MG/DL (ref 0.6–1.2)
DEPRECATED RDW RBC AUTO: 45.8 FL (ref 35.1–46.3)
EOSINOPHIL # BLD: 0.28 K/MM3 (ref 0–0.68)
EOSINOPHIL NFR BLD: 1 % (ref 0–6)
ERYTHROCYTE [DISTWIDTH] IN BLOOD BY AUTOMATED COUNT: 17.2 % (ref 11.7–14.2)
GLOBULIN SER CALC-MCNC: 4.2 G/DL (ref 2.2–4)
GLUCOSE SERPL-MCNC: 147 MG/DL (ref 70–99)
HCT VFR BLD AUTO: 41 % (ref 37–53)
HGB BLD-MCNC: 14.3 G/DL (ref 13.5–17.5)
LYMPHOCYTES # BLD: 0.86 K/MM3 (ref 0.84–5.2)
LYMPHOCYTES NFR BLD: 3 % (ref 21–46)
MAGNESIUM SERPL-MCNC: 3 MG/DL (ref 1.6–2.4)
MCHC RBC AUTO-ENTMCNC: 34.9 G/DL (ref 31.5–36.5)
MCV RBC AUTO: 78 FL (ref 80–100)
MONOCYTES # BLD: 1.72 K/MM3 (ref 0.16–1.47)
MONOCYTES NFR BLD: 6 % (ref 4–13)
MYELOCYTES # BLD MANUAL: 0.28 K/MM3 (ref 0–0)
MYELOCYTES NFR BLD MANUAL: 1 % (ref 0–0)
NEUTS SEG # BLD MANUAL: 25.53 K/MM3 (ref 1.96–9.15)
NEUTS SEG NFR BLD MANUAL: 89 % (ref 41–73)
NRBC # BLD AUTO: 0.02 K/MM3 (ref 0–0.02)
NRBC BLD AUTO-RTO: 0.1 /100 WBC (ref 0–0.2)
PHOSPHATE SERPL-MCNC: 12.5 MG/DL (ref 2.5–4.9)
PLATELET # BLD AUTO: 91 K/MM3 (ref 150–400)
POTASSIUM SERPL-SCNC: 5.5 MMOL/L (ref 3.5–5.5)
PROT SERPL-MCNC: 5.6 G/DL (ref 6.4–8.2)
SODIUM SERPL-SCNC: 132 MMOL/L (ref 136–145)
TOTAL CELLS COUNTED BLD: 100

## 2022-01-08 NOTE — NUR
Patient remains intubated with 8.0 ET and 27 cm at teeth with vent setting of
Spon mod PS 5/40/5 and sats 97%. OG in place and is infusing Nephro 1.8 at 20
ml/hr goal and 30 ml/q4 water flushes. 18Fr Nunes output was only 100 mleven
after Bumex gtt. Rectal tube 800 ml output dark brown stool. PICC line
infusing Bumex at 4mg/hr increaed 2nd bag, Heparin 12 units/kg/hr, NS TKO. He
continues to weep from groin area and LUE. Continue full turns to get off
buttocks. He remaisn out of restraints and no UE movement. Nods yes and no to
questions and trackas visually. Temp 99.3

## 2022-01-08 NOTE — NUR
Patient continues without change, minimal secretions from ET. Vent settings
Spon mode PS 5/40/5 and sats >90%. Picc line infusing NS TKO, Heparin
restarted 12 units/kg/hr and Bumex gtt started at 2 mg /hr, No increased urine
in 1st hour of bumex gtt. Remains in A-Fib 100's and systolics 120's. Rectal
tube draining to gravity dark brown liq. stool. Continues to answer yes and no
with head nods.

## 2022-01-08 NOTE — NUR
Patient remains unchanged, repositioned and changed pads where he is weeping
on left side and scrotal area. Dialysis started arounf 0830. Tolerated am meds
through OG.

## 2022-01-08 NOTE — NUR
[facility] Dialysis was able to pull over
5L and systolics still 120. He still alert to nod yes and no. He remains on
spon mode , PS 5/40/5 and sats >90%. Still weeping RUE and scrotal area. Dr Anne has assesed and no other orsers than Bumex.

## 2022-01-08 NOTE — NUR
ASSUMED CARE AT 1900
PATIENT IS ALERT AND FOLLOWONG COMMANDS. NO MOVEMENT OF UPPER EXTREMETIES,
PATIENT ABLE TO WIGGLE HIS TOES. NODS YES/NO TO QUESTIONS. INTUBATED AND ON
VENT, SETTINGS PS 5/5 FI02 40%. LUNGS COARSE TO DIMINISHED WITH A SMALL AMOUNT
OF BROWN SPUTUM SUCTIONED. RR 20S. HR REMIANS A.-120, BP STABLE.
HEPARIN INF.  BLE PULSES FOUND WITH DOPPLER. TUBE FEED INF NEPRO AT GOAL RATE
20 MLS/HR WITH 30 MLS FLUSHES Q4H. KRAFT DRAINING SCANT AMOUNT OF YELLOW
URINE. BUMEX INF. RECTAL TUBE DRAINING BROWN/BLACK LIQUID STOOL. REPOSITIONED
AND ORAL CARE DONE. SEE SHIFT ASSESSMENT FOR MORE INFO.

## 2022-01-08 NOTE — NUR
Received report karen Sewell RN. Patient is intubated and no sedation. Patient
has 8.0 ET 27 cm at teeth and vent settings of spon. mode PS 5/40/5 and sats
>90%. He has Picc Line to JM lang and site WNL's and is infusing
NS TKO. He has Rectal tube in place with dark brown liquid output. He has
mepelex trssings bilateral heel, buttock, and scrotum. He has trialysis cath
to [facility] and is to have Dialysis today. He has OG and is infusing Nephro 1.8 and
goal 20 ml/hr and 30 ml/Q4. eye open and track, knods to questions. Patient is
not in restraints.

## 2022-01-08 NOTE — NUR
END OF SHIFT SUMMARY
NO ACUTE EVENTS OVERNIGHT. PT CONT TO BE INTUBATED WITH VENT SETTINGS SPONT
5/5, FIO2 40%. PT IS REACTIVE TO VERBAL STIMULI AND IS ABLE TO ANSWER Y/N
QUESTIONS VERY WEAKLY. FENTANYL GIVEN X3 AND HELPFUL. AFEBRILE. HR 80-90'S.
SBP . NEPRO INFUSING AT GOAL; MINIMAL RESIDUALS. RECTAL TUBE IN PLACE;
100ML OF OUTPUT. KRAFT IN PLACE WITH MINIMAL URINE OUTPUT; 12ML OF OUTPUT.
WEEPING FROM PREVIOUS CENTRAL LINE TO RT GROIN CONT ALL SHIFT; ABD AND
ABSORBENT PAD CHANGED X5 DUE TO BEING SATURATED. DR BUSTOS IN TO SEE PT AND
STATED THAT PT WILL NEED DIALYSIS TODAY. WILL REPORT TO AM RN WHEN AVAILABLE.

## 2022-01-09 LAB
ALBUMIN SERPL BCP-MCNC: 1.4 G/DL (ref 3.4–5)
ANION GAP SERPL CALCULATED.4IONS-SCNC: 18 MMOL/L (ref 6–16)
BASOPHILS # BLD: 0.25 K/MM3 (ref 0–0.23)
BASOPHILS NFR BLD: 1 % (ref 0–2)
BUN SERPL-MCNC: 147 MG/DL (ref 8–24)
CALCIUM SERPL-MCNC: 6 MG/DL (ref 8.5–10.1)
CHLORIDE SERPL-SCNC: 94 MMOL/L (ref 98–108)
CO2 SERPL-SCNC: 22 MMOL/L (ref 21–32)
CREAT SERPL-MCNC: 7.9 MG/DL (ref 0.6–1.2)
DEPRECATED RDW RBC AUTO: 45.5 FL (ref 35.1–46.3)
EOSINOPHIL # BLD: 0 K/MM3 (ref 0–0.68)
EOSINOPHIL NFR BLD: 0 % (ref 0–6)
ERYTHROCYTE [DISTWIDTH] IN BLOOD BY AUTOMATED COUNT: 17.5 % (ref 11.7–14.2)
FLUAV RNA SPEC QL NAA+PROBE: NEGATIVE
FLUBV RNA SPEC QL NAA+PROBE: NEGATIVE
GLUCOSE SERPL-MCNC: 144 MG/DL (ref 70–99)
HCT VFR BLD AUTO: 39.2 % (ref 37–53)
HGB BLD-MCNC: 14 G/DL (ref 13.5–17.5)
LYMPHOCYTES # BLD: 0.25 K/MM3 (ref 0.84–5.2)
LYMPHOCYTES NFR BLD: 1 % (ref 21–46)
MCHC RBC AUTO-ENTMCNC: 35.7 G/DL (ref 31.5–36.5)
MCV RBC AUTO: 76 FL (ref 80–100)
MONOCYTES # BLD: 2.03 K/MM3 (ref 0.16–1.47)
MONOCYTES NFR BLD: 8 % (ref 4–13)
NEUTS BAND NFR BLD MANUAL: 4 % (ref 0–8)
NEUTS SEG # BLD MANUAL: 22.89 K/MM3 (ref 1.96–9.15)
NEUTS SEG NFR BLD MANUAL: 86 % (ref 41–73)
NRBC # BLD AUTO: 0.03 K/MM3 (ref 0–0.02)
NRBC BLD AUTO-RTO: 0.1 /100 WBC (ref 0–0.2)
PHOSPHATE SERPL-MCNC: 9.5 MG/DL (ref 2.5–4.9)
PLATELET # BLD AUTO: 97 K/MM3 (ref 150–400)
POTASSIUM SERPL-SCNC: 4.8 MMOL/L (ref 3.5–5.5)
RSV RNA SPEC QL NAA+PROBE: NEGATIVE
SARS-COV-2 RNA RESP QL NAA+PROBE: NEGATIVE
SODIUM SERPL-SCNC: 134 MMOL/L (ref 136–145)
TOTAL CELLS COUNTED BLD: 100

## 2022-01-09 NOTE — NUR
PATIENT ALERT AND ORIENTED, ANSWERS Y/N WITH HEAD MOVEMENT, ROM TO
EXTREMITIES, SON VISITED TODAY, PROPOFOL STARTED PER DR WAYNE FOR COMFORT.
SCROTUMN STILL SWOLLA, ICE TO AREA, CHANGED ABD PAD, TELE AFIB-SNR, SBP 90S,
HEART RATE 70S, DIALYSIS TO BE DONE TOMORROW, POOR OUTPU, DR WAYNE AND DR BUSTOS
AWARE. TF AT 20 ML/HR, HEPARIN, TKO AND PROPOFOL INFUSING. VENT AC ,
40$, PEEP 5, RATE 20. WILL RELAY TO PM RN, ECTM

## 2022-01-09 NOTE — NUR
SHIFT SUMMARY
PATIENT INTUBATED WITH NO SEDATION AT THIS TIME. ALERT AND ABLE TO FOLLOW
COMMANDS. NODS YES/NO TO QUESTIONS. ABLE TO WIGGLE TOES AND FINGERS. 02 SATS
>93% ON PS 5/5 40%, RR 20s. SMALL AMOUNT OF BROWN PHLEGM SUCTIONED. LUNGS
SOUNDS COARSE TO DIMINISHED. HR REMAINS A.FIB 90s-110. BP STABLE. KRAFT WITH
MINIMAL OUTPUT 50 MLS WITH SEDIMENT. RECTAL TUBE HAS LIQUID BROWN OUTPUT 200
MLS. TUBE FEED REMAINS INF AT GOAL RATE, MINIMAL TO NO RISIDUALS. MIDNIGHT CBG
52, AMP OF D50 GIVEN PER HOSPITALIST. BED BATH DONE, MEPILEX ON COCCCYX
CHANGED. REPOSITIONED Q2 HOURS. BUMEX AND HEPARIN INF. FENTANYL PRN FOR PAIN.

## 2022-01-09 NOTE — NUR
ASSUMED CARE AT 1900
PT LAYING IN BED INTUBATED WITH VENT SETTINGS AC/PC 20, 14/5, FIO2 40%;
MODERATE AMOUNT OF THICK YELLOW SECREATIONS FROM ETT. PT SEDATED WITH PROPOFOL
INFUSING AT 18MCG/KG/MIN; PT STILL ABLE TO OPEN EYES, FOLLOW DIRECTIONS, AND
ANSWER Y/N QUESTIONS WITH HEAD NODS; VERY WEAKLY ABLE TO MOVE TOES AND
FINGERS. AFEBRILE. AFIB NOTED WITH HR 70-90'S. -110; GENERAL 3+ EDEMA
NOTED. NEPRO INFUSING VIA OG AT 20ML/HR (GOAL) WITH 30ML WATER FLUSHES Q4HR;
MINIMAL RESIDUALS. RECTAL TUBE PATENT AND DRAINING TO GRAVITY; KRAFT IN PLACE
WITH MINIMAL OUTPUT. WEEPING TO RT GROIN DECREASED. MEPILEX TO COCCYX IN
PLACE; PLAN TO CHANGE. HEPARIN INFUSING AT 12UNITS/KG/HR. SEE SHIFT ASSESSMENT
FOR FULL ASSESSMENT.

## 2022-01-10 LAB
ALBUMIN SERPL BCP-MCNC: 1.2 G/DL (ref 3.4–5)
ANION GAP SERPL CALCULATED.4IONS-SCNC: 20 MMOL/L (ref 6–16)
BASOPHILS # BLD AUTO: 0.06 K/MM3 (ref 0–0.23)
BASOPHILS NFR BLD AUTO: 0 % (ref 0–2)
BUN SERPL-MCNC: 171 MG/DL (ref 8–24)
CALCIUM SERPL-MCNC: 5.4 MG/DL (ref 8.5–10.1)
CHLORIDE SERPL-SCNC: 94 MMOL/L (ref 98–108)
CO2 SERPL-SCNC: 20 MMOL/L (ref 21–32)
CREAT SERPL-MCNC: 9.18 MG/DL (ref 0.6–1.2)
DEPRECATED RDW RBC AUTO: 45.4 FL (ref 35.1–46.3)
EOSINOPHIL # BLD AUTO: 0.27 K/MM3 (ref 0–0.68)
EOSINOPHIL NFR BLD AUTO: 1 % (ref 0–6)
ERYTHROCYTE [DISTWIDTH] IN BLOOD BY AUTOMATED COUNT: 17.9 % (ref 11.7–14.2)
GLUCOSE SERPL-MCNC: 116 MG/DL (ref 70–99)
HCT VFR BLD AUTO: 38.1 % (ref 37–53)
HGB BLD-MCNC: 13.5 G/DL (ref 13.5–17.5)
IMM GRANULOCYTES # BLD AUTO: 1.77 K/MM3 (ref 0–0.1)
IMM GRANULOCYTES NFR BLD AUTO: 7 % (ref 0–1)
LYMPHOCYTES # BLD AUTO: 0.65 K/MM3 (ref 0.84–5.2)
LYMPHOCYTES NFR BLD AUTO: 3 % (ref 21–46)
MAGNESIUM SERPL-MCNC: 2.9 MG/DL (ref 1.6–2.4)
MCHC RBC AUTO-ENTMCNC: 35.4 G/DL (ref 31.5–36.5)
MCV RBC AUTO: 76 FL (ref 80–100)
MONOCYTES # BLD AUTO: 1.33 K/MM3 (ref 0.16–1.47)
MONOCYTES NFR BLD AUTO: 5 % (ref 4–13)
NEUTROPHILS # BLD AUTO: 21.35 K/MM3 (ref 1.96–9.15)
NEUTROPHILS NFR BLD AUTO: 84 % (ref 41–73)
NRBC # BLD AUTO: 0 K/MM3 (ref 0–0.02)
NRBC BLD AUTO-RTO: 0 /100 WBC (ref 0–0.2)
PHOSPHATE SERPL-MCNC: 10.1 MG/DL (ref 2.5–4.9)
PLATELET # BLD AUTO: 96 K/MM3 (ref 150–400)
POTASSIUM SERPL-SCNC: 5 MMOL/L (ref 3.5–5.5)
SODIUM SERPL-SCNC: 134 MMOL/L (ref 136–145)

## 2022-01-10 NOTE — NUR
SHIFT SUMMARY
NO ACUTE CHANGES THIS SHIFT. PT REMAINS INTUBATED AND SEDATED. PT ON PRESSURE
SUPPORT 5/5, FIO2 30%. PT WITH WEAK COUGH AND MODERATE AMOUNT OF ETT
SECRETIONS WITH SUCTION. PROPOFOL INFUSING AT 30 MCG/KG/MIN. PT ABLE TO OPEN
EYES TO VERBAL STIMULI AND SHAKES HEAD YES/NO TO SOME QUESTIONS. PICC TO JM
REMAINS C/D/I. NS INFUSING TKO. HEPARIN PAUSED THIS AFTERNOON FOR REMOVAL OF
TRIALYSIS CATH. HEPARIN GTT RESTARTED AT 11 UNITS/KG/HR PER PHARMACY. OGT
REMAINS IN PLACE WITH TF INFUSING AT GOAL RATE. KRAFT TEMP PROBE REMAINS IN
PLACE WITH SCANT AMOUNT OF DARK YELLOW OUTPUT NOTED. PT RECIEVED DIALYSIS THIS
MORNING PRIOR TO TRIALYSIS CATH REMOVAL. PLAN FOR PERMACATH PLACEMENT
TOMORROW. RECTAL TUBE REMAINS IN PLACE WITH SOFT BROWN OUTPUT NOTED. PT
REMAINS JAUNDICED AND WITH EDEMA THROUGHOUT. VITAL SIGNS HAVE REMAINED STABLE.
PT DAUGHTER AT BEDSIDE THIS AFTERNOON AND UPDATED TO CURRENT CONDITION AND
PLAN OF CARE. WILL CONTINUE TO MONITOR AND REPORT OFF TO ONCOMING RN.

## 2022-01-10 NOTE — NUR
END OF SHIFT SUMMARY
NO ACUTE EVENTS OVERNIGHT. PT CONT TO BE INTUBATED WITH VENT SETTINGS AC/PC
20, 14/5, FIO2 40%. PT RESPONSIVE TO VERBAL STIMULI AND IS ABLE TO ANSWER Y/N
QUESTIONS WITH HEAD NOD; PROPOFOL INFUSING AT 18MCG/KG/MIN. AFEBRILE. NSR
NOTED WITH HR 70-80'S. -120. NEPRO INFUSING AT GOAL; MINIMAL RESIDUALS.
RECTAL TUBE IN PLACE. KRAFT IN PLACE WITH MINIMAL OUTPUT; 60ML OF URINE.
TIRALYSIS LINE TO RT IJ. PICC TO JM PATENT AND INFUSING; DRESSING CHANGED
THIS SHIFT. HEPARIN INFUSING AT 12UNITS/KG/HR. WILL REPORT TO AM RN WHEN
AVAILABLE.

## 2022-01-10 NOTE — NUR
UPDATE
INFECTION CONTROL CALLED AND STATED THAT PT CAN COME OUT OF ISOLATION. THE
RESPIRATORY PANEL THAT WAS SENT YESTERDAY CAME BACK NEGATIVE. PT NOW OUT OF
ISOLATION.

## 2022-01-11 LAB
ALBUMIN SERPL BCP-MCNC: 1.1 G/DL (ref 3.4–5)
ANION GAP SERPL CALCULATED.4IONS-SCNC: 17 MMOL/L (ref 6–16)
BASOPHILS # BLD AUTO: 0.13 K/MM3 (ref 0–0.23)
BASOPHILS NFR BLD AUTO: 1 % (ref 0–2)
BUN SERPL-MCNC: 134 MG/DL (ref 8–24)
CALCIUM SERPL-MCNC: 5.6 MG/DL (ref 8.5–10.1)
CHLORIDE SERPL-SCNC: 92 MMOL/L (ref 98–108)
CO2 SERPL-SCNC: 20 MMOL/L (ref 21–32)
CREAT SERPL-MCNC: 7.17 MG/DL (ref 0.6–1.2)
DEPRECATED RDW RBC AUTO: 46.2 FL (ref 35.1–46.3)
EOSINOPHIL # BLD AUTO: 0.23 K/MM3 (ref 0–0.68)
EOSINOPHIL NFR BLD AUTO: 1 % (ref 0–6)
ERYTHROCYTE [DISTWIDTH] IN BLOOD BY AUTOMATED COUNT: 18.2 % (ref 11.7–14.2)
GLUCOSE SERPL-MCNC: 202 MG/DL (ref 70–99)
HCT VFR BLD AUTO: 35 % (ref 37–53)
HGB BLD-MCNC: 12.8 G/DL (ref 13.5–17.5)
IMM GRANULOCYTES # BLD AUTO: 1.19 K/MM3 (ref 0–0.1)
IMM GRANULOCYTES NFR BLD AUTO: 5 % (ref 0–1)
LYMPHOCYTES # BLD AUTO: 0.45 K/MM3 (ref 0.84–5.2)
LYMPHOCYTES NFR BLD AUTO: 2 % (ref 21–46)
MAGNESIUM SERPL-MCNC: 2.5 MG/DL (ref 1.6–2.4)
MCHC RBC AUTO-ENTMCNC: 36.6 G/DL (ref 31.5–36.5)
MCV RBC AUTO: 76 FL (ref 80–100)
MONOCYTES # BLD AUTO: 0.97 K/MM3 (ref 0.16–1.47)
MONOCYTES NFR BLD AUTO: 4 % (ref 4–13)
NEUTROPHILS # BLD AUTO: 18.95 K/MM3 (ref 1.96–9.15)
NEUTROPHILS NFR BLD AUTO: 87 % (ref 41–73)
NRBC # BLD AUTO: 0 K/MM3 (ref 0–0.02)
NRBC BLD AUTO-RTO: 0 /100 WBC (ref 0–0.2)
PHOSPHATE SERPL-MCNC: 7.4 MG/DL (ref 2.5–4.9)
PLATELET # BLD AUTO: 115 K/MM3 (ref 150–400)
POTASSIUM SERPL-SCNC: 4.6 MMOL/L (ref 3.5–5.5)
SODIUM SERPL-SCNC: 129 MMOL/L (ref 136–145)
VANCOMYCIN SERPL-MCNC: 19.5 UG/ML

## 2022-01-11 NOTE — NUR
SHIFT SUMMARY
NO ACUTE CHANGES THIS SHIFT. PT REMAINS INTUBATED AND SEDATED. VENT SETTINGS
UNCHANGED, PT REMAINS ON PRESSURE SUPPORT 5/5, FIO2 30%. PT WITH MODERATE
AMOUNT OF THICK TAN ETT SECRETIONS WITH SUCTION. PT SEDATED WITH PROPOFOL AT
30 MCG/KG/MIN. PICC TO JM C/D/I. HEPARIN GTT INFUSING AT 12 UNITS/KG/HR, NS
TKO, AND LEVOPHED 2 MCG/MIN. OGT REMAINS IN PLACE WITH TF INFUSING AT 30 ML/HR
GOAL RATE. KRAFT TEMP PROBE REMAINS IN PLACE WITH SCANT AMOUNT OF DARK YELLOW
OUTPUT NOTED. RECTAL TUBE REMAINS IN PLACE WITH SOFT BROWN/GREY OUTPUT NOTED.
PT REMAINS JAUNDICED AND EDEMATOUS. VITAL SIGNS STABLE. PT SPONTANEOUSLY OPENS
EYES AND SHAKES HEAD YES/NO TO SOME QUESTIONS. WILL CONTINUE TO MONITOR AND
REPORT OFF TO ONCOMING RN.

## 2022-01-11 NOTE — NUR
END OF SHIFT SUMMARY:
At the start of shift, pt was not very responsive to verbal stimuli. Was able
to grimaces to pain but unable to move any extremity to pain. At around
2000, noticed that pt temperature is going home ranging between 99.8 F to
100.0 F. Gave PO acetaminophen, turned temp in room down, ice packs and gave
pt cold bed bath. Around 2230 - 2300, pt temp still increasing, called Domingo Cramer to report fever and hypotension. MD ordered blood cultures,
Levophed to be started and Vancomycin. Started Levophed to keep MAP >65. Pt
also had hypoglycemia despite holding Lantus. Gave 1 amp of D50 per
hospitalist. Around 0300, pt.'s temp starting to go down, pt responding more
to verbal stimuli able to nod yes and slightly shake head for no. Still unable
to move any extremities but able to shrug shoulders to command. Nunes remains
in place but with little to no output overnight (20 mL total). Rectal tube
also patent and remains in place with total output of 500 mL over night. Pt
does have a rash all over abdomen area and coccyx wound cleansed and new
mepilex placed. Per order, Heparin infusion off and Tube Feeding off for
catheter placement in the afternoon.

## 2022-01-12 LAB
ALBUMIN SERPL BCP-MCNC: 1.1 G/DL (ref 3.4–5)
ALBUMIN SERPL BCP-MCNC: 1.4 G/DL (ref 3.4–5)
ALBUMIN/GLOB SERPL: 0.3 {RATIO} (ref 0.8–1.8)
ALT SERPL W P-5'-P-CCNC: 161 U/L (ref 12–78)
ANION GAP SERPL CALCULATED.4IONS-SCNC: 17 MMOL/L (ref 6–16)
AST SERPL W P-5'-P-CCNC: 120 U/L (ref 12–37)
BASOPHILS # BLD AUTO: 0.1 K/MM3 (ref 0–0.23)
BASOPHILS NFR BLD AUTO: 1 % (ref 0–2)
BILIRUB DIRECT SERPL-MCNC: 12.4 MG/DL (ref 0–0.3)
BILIRUB INDIRECT SERPL-MCNC: 2.4 MG/DL (ref 0.1–0.7)
BILIRUB SERPL-MCNC: 14.8 MG/DL (ref 0.1–1)
BUN SERPL-MCNC: 166 MG/DL (ref 8–24)
CALCIUM SERPL-MCNC: 6.3 MG/DL (ref 8.5–10.1)
CHLORIDE SERPL-SCNC: 94 MMOL/L (ref 98–108)
CO2 SERPL-SCNC: 21 MMOL/L (ref 21–32)
CREAT SERPL-MCNC: 8.97 MG/DL (ref 0.6–1.2)
DEPRECATED RDW RBC AUTO: 47.6 FL (ref 35.1–46.3)
EOSINOPHIL # BLD AUTO: 0.12 K/MM3 (ref 0–0.68)
EOSINOPHIL NFR BLD AUTO: 1 % (ref 0–6)
ERYTHROCYTE [DISTWIDTH] IN BLOOD BY AUTOMATED COUNT: 18.8 % (ref 11.7–14.2)
GLOBULIN SER CALC-MCNC: 4.1 G/DL (ref 2.2–4)
GLUCOSE SERPL-MCNC: 128 MG/DL (ref 70–99)
HCT VFR BLD AUTO: 35.6 % (ref 37–53)
HCT VFR BLD AUTO: 35.8 % (ref 37–53)
HCT VFR BLD AUTO: 36.1 % (ref 37–53)
HGB BLD-MCNC: 12.5 G/DL (ref 13.5–17.5)
HGB BLD-MCNC: 12.7 G/DL (ref 13.5–17.5)
HGB BLD-MCNC: 12.8 G/DL (ref 13.5–17.5)
IMM GRANULOCYTES # BLD AUTO: 0.89 K/MM3 (ref 0–0.1)
IMM GRANULOCYTES NFR BLD AUTO: 5 % (ref 0–1)
LYMPHOCYTES # BLD AUTO: 0.57 K/MM3 (ref 0.84–5.2)
LYMPHOCYTES NFR BLD AUTO: 3 % (ref 21–46)
MAGNESIUM SERPL-MCNC: 2.7 MG/DL (ref 1.6–2.4)
MCHC RBC AUTO-ENTMCNC: 35.5 G/DL (ref 31.5–36.5)
MCV RBC AUTO: 76 FL (ref 80–100)
MONOCYTES # BLD AUTO: 0.97 K/MM3 (ref 0.16–1.47)
MONOCYTES NFR BLD AUTO: 5 % (ref 4–13)
NEUTROPHILS # BLD AUTO: 16.68 K/MM3 (ref 1.96–9.15)
NEUTROPHILS NFR BLD AUTO: 86 % (ref 41–73)
NRBC # BLD AUTO: 0 K/MM3 (ref 0–0.02)
NRBC BLD AUTO-RTO: 0 /100 WBC (ref 0–0.2)
PHOSPHATE SERPL-MCNC: 8.4 MG/DL (ref 2.5–4.9)
PLATELET # BLD AUTO: 107 K/MM3 (ref 150–400)
POTASSIUM SERPL-SCNC: 5.3 MMOL/L (ref 3.5–5.5)
PROT SERPL-MCNC: 5.5 G/DL (ref 6.4–8.2)
PROTHROMBIN TIME: 14.4 SEC (ref 9.7–11.5)
SODIUM SERPL-SCNC: 132 MMOL/L (ref 136–145)
VANCOMYCIN SERPL-MCNC: 21.4 UG/ML

## 2022-01-12 PROCEDURE — 0W3P8ZZ CONTROL BLEEDING IN GASTROINTESTINAL TRACT, VIA NATURAL OR ARTIFICIAL OPENING ENDOSCOPIC: ICD-10-PCS | Performed by: INTERNAL MEDICINE

## 2022-01-12 PROCEDURE — 5A1D70Z PERFORMANCE OF URINARY FILTRATION, INTERMITTENT, LESS THAN 6 HOURS PER DAY: ICD-10-PCS | Performed by: HOSPITALIST

## 2022-01-12 PROCEDURE — 02HV33Z INSERTION OF INFUSION DEVICE INTO SUPERIOR VENA CAVA, PERCUTANEOUS APPROACH: ICD-10-PCS | Performed by: STUDENT IN AN ORGANIZED HEALTH CARE EDUCATION/TRAINING PROGRAM

## 2022-01-12 PROCEDURE — B548ZZA ULTRASONOGRAPHY OF SUPERIOR VENA CAVA, GUIDANCE: ICD-10-PCS | Performed by: STUDENT IN AN ORGANIZED HEALTH CARE EDUCATION/TRAINING PROGRAM

## 2022-01-12 NOTE — NUR
Pt back from OR. Pt had been receiving phenylephrine pushes to sustain BP.
Levophed incrementally increased from 10 mcg/min to 20 mcg/min. Propofol on
SB. Dr Solorzano notified. 350 mL bolus of NS given. Dobutamine and neosynephrine
available.
 
Pt's stool is dark black. Plan for GI to perform EGD when patient is done with
dialysis. Pt currently receiving dialysis.

## 2022-01-12 NOTE — NUR
.History, Chart, Medications and Allergies reviewed before start of
procedure.
INTO ICU ROOM 4 TO DO CASE WITH DR KIM PATIENT VENTED AND ON DRIP

## 2022-01-12 NOTE — NUR
ASSUMED CARE AT 1900
PT LAYING BED INTUBATED WITH VENT SETTINGS AC/PC 12, 10/5, FIO2 30%; SMALL
AMOUNT OF THICK YELLOW SECREATIONS FROM ETT. PT SEDATED WITH PROPOFOL INFUSING
AT 15MCG/KG/MIN; PT REACTIVE TO NOXIOUS AND PAINFUL STIMULI; ATTMENT TO OPEN
EYES WHEN ASKED BUT IS NOT SUCCESSFUL; PROFOUNDLY WEAK. TEMP 99.9. -120.
-150'S; TITRATED LEVOPHED DOWN TO 13MCG/MIN; VASOPRESSIN INFUSING AT
0.04UNITS/MIN; SEE FLOWSHEET FOR TITRATIONS. NEPRO PLACED ON HOLD NOW DUE TO
ANTICIPATED SCOPE TONIGHT. RECTAL TUBE IN PLACE AND DRAINING BLACK LIQUID
STOOL. KRAFT IN PLACE AND DRAINING MINIMAL URINE. SEE SHIFT ASSESSMENT FOR
FULL ASSESSMENT.

## 2022-01-12 NOTE — NUR
SUMMARY
 
Neuro: Receiving 15 mcg/kg/min propofol. Fluctuating mentation. Pt opened eyes
to verbal stimulus prior to surgery. After surgery, pt is only responsive to
pain. Intermittent cough and gag reflexes. Pupils 3 mm, brisk response to
light.
 
Musculoskeletal: Pt does not move arms or legs. Requires maximum assistance
for Q2H repositions.
 
Respiratory: 8.0 cm ETT, 27 cm at the teeth. Vent settings PC 12/5 and 30%
FiO2. SpO2 90% or greater. Small amount of thick tan secretions from ETT.
Coarse upper lobes, diminished otherwise.
 
Cardiac: Atrial flutter with rate . Currently receiving 18mcg/min
levophed and receiving vasopressin. Profoundly edematous, however unchanged
from start of shift.
 
GI: TF restarted at goal rate. Rectal tube with large amount of black stool
this shift.
 
: Nunes had 25 mL urine output. Pt had hemodialysis todya.
 
Skin: Unchanged from initial assessment. Fresh dressing placed to sacrum
today.
 
Psych: MELANI due to intubation and sedation.

## 2022-01-12 NOTE — NUR
01/12/22 2309 Frank Noe
Bite Block Placed. History, Chart, Medications and Allergies reviewed
before start of procedure. MONITOR INTACT WITH CONTINUOUS PULSE
OXIMETRY AND INTERMITTENT BP. KPATIENT INTUBATED AND VENTED PRIOR TO
PROCDURE RN ADMUSTING SEDATION AS NEEDED.

## 2022-01-12 NOTE — NUR
BEGINNING OF SHIFT
 
Assumed care of pt at 0700 with Bobbi LEIGH. Report received from Chelsey LEIGH. Pt
on ventilator, spontaneous mode with PS 5/5, 30% FiO2. RR 22-26, Tidal volume
500-700 mL. SpO2 90% or greater. 8.0 cm ETT, 27 cm at teeth.

## 2022-01-12 NOTE — NUR
END OF SHIFT SUMMARY:
Pt remains sedated with Propofol, unable to follow commands and no movement
to painful stimuli on any extremity. Does grimaces to pain during oral care
and repositioning. Remains on the ventilator, spontenous pressure support FiO2
30% and PEEP 5. Thick yellowish tan secretions from ETT and oral. Sent a
tracheal aspirate for sputum culture due to fever.
 
Pt remains hypotensive, vasopressin was added alongside levophed to maintain
MAP goal >65. Pt still has a fever despite ice and acetaminophen.

## 2022-01-12 NOTE — NUR
01/12/22 1458 Alexey Hill
PATIENT ARRIVED TO THE OR FROM ICU. PATIENT INTUBATED W/CENTRAL LINE,
PICC LINE, KRAFT AND RECTAL TUBE. PT ON SCHEDULED ANTIBIOTICS.

## 2022-01-13 LAB
ALBUMIN SERPL BCP-MCNC: 1.3 G/DL (ref 3.4–5)
ALBUMIN/GLOB SERPL: 0.3 {RATIO} (ref 0.8–1.8)
ALT SERPL W P-5'-P-CCNC: 140 U/L (ref 12–78)
ANION GAP SERPL CALCULATED.4IONS-SCNC: 17 MMOL/L (ref 6–16)
AST SERPL W P-5'-P-CCNC: 114 U/L (ref 12–37)
BASOPHILS # BLD AUTO: 0.11 K/MM3 (ref 0–0.23)
BASOPHILS NFR BLD AUTO: 1 % (ref 0–2)
BILIRUB SERPL-MCNC: 16.9 MG/DL (ref 0.1–1)
BUN SERPL-MCNC: 140 MG/DL (ref 8–24)
CALCIUM SERPL-MCNC: 7.3 MG/DL (ref 8.5–10.1)
CHLORIDE SERPL-SCNC: 95 MMOL/L (ref 98–108)
CO2 SERPL-SCNC: 22 MMOL/L (ref 21–32)
CREAT SERPL-MCNC: 8.3 MG/DL (ref 0.6–1.2)
DEPRECATED RDW RBC AUTO: 48.3 FL (ref 35.1–46.3)
EOSINOPHIL # BLD AUTO: 0.15 K/MM3 (ref 0–0.68)
EOSINOPHIL NFR BLD AUTO: 1 % (ref 0–6)
ERYTHROCYTE [DISTWIDTH] IN BLOOD BY AUTOMATED COUNT: 19 % (ref 11.7–14.2)
GLOBULIN SER CALC-MCNC: 4.2 G/DL (ref 2.2–4)
GLUCOSE SERPL-MCNC: 131 MG/DL (ref 70–99)
HCT VFR BLD AUTO: 35.2 % (ref 37–53)
HCT VFR BLD AUTO: 35.4 % (ref 37–53)
HCT VFR BLD AUTO: 36.1 % (ref 37–53)
HGB BLD-MCNC: 12.3 G/DL (ref 13.5–17.5)
HGB BLD-MCNC: 12.4 G/DL (ref 13.5–17.5)
HGB BLD-MCNC: 12.6 G/DL (ref 13.5–17.5)
IMM GRANULOCYTES # BLD AUTO: 0.76 K/MM3 (ref 0–0.1)
IMM GRANULOCYTES NFR BLD AUTO: 5 % (ref 0–1)
LYMPHOCYTES # BLD AUTO: 0.56 K/MM3 (ref 0.84–5.2)
LYMPHOCYTES NFR BLD AUTO: 3 % (ref 21–46)
MAGNESIUM SERPL-MCNC: 2.6 MG/DL (ref 1.6–2.4)
MCHC RBC AUTO-ENTMCNC: 34.9 G/DL (ref 31.5–36.5)
MCV RBC AUTO: 77 FL (ref 80–100)
MONOCYTES # BLD AUTO: 0.79 K/MM3 (ref 0.16–1.47)
MONOCYTES NFR BLD AUTO: 5 % (ref 4–13)
NEUTROPHILS # BLD AUTO: 13.97 K/MM3 (ref 1.96–9.15)
NEUTROPHILS NFR BLD AUTO: 86 % (ref 41–73)
NRBC # BLD AUTO: 0 K/MM3 (ref 0–0.02)
NRBC BLD AUTO-RTO: 0 /100 WBC (ref 0–0.2)
PHOSPHATE SERPL-MCNC: 8.6 MG/DL (ref 2.5–4.9)
PLATELET # BLD AUTO: 121 K/MM3 (ref 150–400)
POTASSIUM SERPL-SCNC: 4.7 MMOL/L (ref 3.5–5.5)
PROT SERPL-MCNC: 5.5 G/DL (ref 6.4–8.2)
PROTHROMBIN TIME: 14.5 SEC (ref 9.7–11.5)
SODIUM SERPL-SCNC: 134 MMOL/L (ref 136–145)
VANCOMYCIN SERPL-MCNC: 24.6 UG/ML

## 2022-01-13 PROCEDURE — 0BJ08ZZ INSPECTION OF TRACHEOBRONCHIAL TREE, VIA NATURAL OR ARTIFICIAL OPENING ENDOSCOPIC: ICD-10-PCS | Performed by: INTERNAL MEDICINE

## 2022-01-13 PROCEDURE — 0B113F4 BYPASS TRACHEA TO CUTANEOUS WITH TRACHEOSTOMY DEVICE, PERCUTANEOUS APPROACH: ICD-10-PCS | Performed by: STUDENT IN AN ORGANIZED HEALTH CARE EDUCATION/TRAINING PROGRAM

## 2022-01-13 NOTE — NUR
TRACH INSERTION
 
0930 - Dr Man and Dr Nunez to room. Ventilator changed from pressure support
to pressure control 15, 15/5, 20% FiO2
0940 - Donovan at head of bed, Enrique setting up field
0945 - Propofol increased to 40 mcg/kg/min. 10 mg morphine given. 50 mg
rocuronium given
0947 - 50 mg propofol given
0948 - NS started at 150 mL/hr
0949 - Neck draped
0952 - 50 mg rocuronium given due to pt movement
0953 - 50 mg propofol given
0954 - 8.0 perc trach shiley in, cuff inflated, ventilator attached
0958 - Procedure over

## 2022-01-13 NOTE — NUR
UPDATE
DR KIM AT BEDSIDE AND FINISHED SCOPE. ONE BLEED FOUND AND 3 CLIPS PLACED ON
BLEEDING AREA. PROPOFOL TITRATED UP TO 45MCG/KG/MIN WITH LEVOPHED TITRATING
UP TO 13MCG/MIN FOR PROCEDURE. NOW PROPFOL AT 25MCG/KG/MIN WITH LEVOPHED AT
10MCG/MIN. PT TOLERATED WELL. OG REMOVED DURING PROCEDURE AND NOT REPLACED. DR KIM STATED TO CONT TO HOLD THE HEPARIN AND REPLACE AN OG IN A COUPLE OF
DAYS.

## 2022-01-13 NOTE — NUR
SUMMARY
 
Neuro: Propofol off. Responsive to verbal stimulus. Observed pt nodding head
yes/no appropriately, and smiling while friend, Gorge, was visiting. Cough and
gag present.
 
Musculoskeletal: Pt does not move arms or legs. Shrugs shoulders.  Requires
maximum assistance for Q2H repositions.
 
Respiratory: 8.0 perc trach, shiley.  Vent settings PS 12/5 and 30%
FiO2. SpO2 90% or greater. Small amount of thick tan secretions from ETT.
Lungs coarse in all lobes.
 
Cardiac: Atrial flutter with rate 105-110. Currently receiving 16 mcg/min
levophed and receiving vasopressin. Profoundly edematous, however unchanged
from start of shift.
 
GI: TF on standby. Dobhoff in place. 70 cm at nare. Does not flush, but per Dr Man, plan to leave dobhoff in place and reassess in AM.  Rectal tube with
large amount of black stool this shift.
 
: Nunes had 25 mL urine output. Pt did not have hemodialysis today.
 
Skin: Unchanged from initial assessment. Fresh dressing placed to sacrum
today.
 
Psych: Unable to fully assess, however pt facial expressions appropriate while
friend, Gorge, visiting at bedside.

## 2022-01-13 NOTE — NUR
ASSUMED CARE AT 1900
PT LAYING IN BED ON VENT VIA TRACH THAT WAS PLACED TODAY; VENT SETTINGS SPONT
12/5, FIO2 30%; MODERATE AMOUNT OF RED SECREATIONS FROM TRACH; RED LEAKING
AROUND TRACH, DRESSING CHANGED. PT RESPONSIVE TO VERBAL STIMULI; IS ABLE TO
ANSWER Y/N QUESTIONS WITH MINIMAL HEAD NODS;  ATTEMPTS TO FOLLOW DIRECTIONS OF
MOVING TOES AND FINGER; PROFOUNDLY WEAK; NO PROPOFOL INFUSING. AFEBRILE. HR
110. -115; LEVOPHED INFUSING AT 16MCG/MIN; VASOPRESSIN INFUSING AT
0.04UNITS/MIN. DOBHOFF IN PLACE AND CLAMPED; RECTAL TUBE IN PLACE AND DRAINING
BLACK/BROWN STOOL; PROTONIX GTT INFUSING. KRAFT IN PLACE AND DRAINING MINIMAL
OUTPUT. SEE SHIFT ASSESSMENT FOR FULL ASSESSMENT.

## 2022-01-13 NOTE — NUR
END OF SHIFT SUMMARY
PT CONT TO BE INTUBATED WITH VENT SETTINGS NOW SPONT 5/5, FIO2 30%;
SMALL-MODERATE AMOUNT OF THICK YELLOW SECREATIONS FROM ETT. PT IS OPENING EYES
ON COMMAND AND MOVING TOES, UNABLE TO MOVE FINGERS AND NOT ANSWERING Y/N
QUESTIONS; PROPOFOL INFUSING AT 25MCG/KG/MIN. MAX TEMP 100.0. -115. SBP
110-130; LEVOPHED INFUSING AT 8MCG/MIN; VASOPRESSING INFUSING AT
0.04UNITS/MIN. RECTAL TUBE IN PLACE WITH 650ML OF BLACK/BROWN LIQUID OUTPUT.
OG NO LONGER IN PLACE; SEE PREVIOUS NOTE. KRAFT IN PLACE WITH 100ML URINE
OUTPUT. PERMACATH TO RT CHEST DRESSING INTACT. WILL REPORT TO AM RN WHEN
AVAILABLE.

## 2022-01-13 NOTE — NUR
TOOK OVER CARE OF PT AT 0700 1/13/22. PT STABLE WITH CURRENT INTERVENTIONS-
SEE MAR, AND TOLERATING VENTILATION.

## 2022-01-14 LAB
ALBUMIN SERPL BCP-MCNC: 1.4 G/DL (ref 3.4–5)
ALBUMIN/GLOB SERPL: 0.3 {RATIO} (ref 0.8–1.8)
ALT SERPL W P-5'-P-CCNC: 139 U/L (ref 12–78)
ANION GAP SERPL CALCULATED.4IONS-SCNC: 19 MMOL/L (ref 6–16)
AST SERPL W P-5'-P-CCNC: 152 U/L (ref 12–37)
BASOPHILS # BLD AUTO: 0.09 K/MM3 (ref 0–0.23)
BASOPHILS NFR BLD AUTO: 1 % (ref 0–2)
BILIRUB SERPL-MCNC: 18.3 MG/DL (ref 0.1–1)
BUN SERPL-MCNC: 160 MG/DL (ref 8–24)
CALCIUM SERPL-MCNC: 7 MG/DL (ref 8.5–10.1)
CHLORIDE SERPL-SCNC: 94 MMOL/L (ref 98–108)
CO2 SERPL-SCNC: 18 MMOL/L (ref 21–32)
CREAT SERPL-MCNC: 9.47 MG/DL (ref 0.6–1.2)
DEPRECATED RDW RBC AUTO: 47.5 FL (ref 35.1–46.3)
EOSINOPHIL # BLD AUTO: 0.18 K/MM3 (ref 0–0.68)
EOSINOPHIL NFR BLD AUTO: 1 % (ref 0–6)
ERYTHROCYTE [DISTWIDTH] IN BLOOD BY AUTOMATED COUNT: 19.2 % (ref 11.7–14.2)
GLOBULIN SER CALC-MCNC: 4.4 G/DL (ref 2.2–4)
GLUCOSE SERPL-MCNC: 97 MG/DL (ref 70–99)
HCT VFR BLD AUTO: 28.7 % (ref 37–53)
HCT VFR BLD AUTO: 30.1 % (ref 37–53)
HCT VFR BLD AUTO: 34.8 % (ref 37–53)
HGB BLD-MCNC: 10.2 G/DL (ref 13.5–17.5)
HGB BLD-MCNC: 10.7 G/DL (ref 13.5–17.5)
HGB BLD-MCNC: 12.1 G/DL (ref 13.5–17.5)
IMM GRANULOCYTES # BLD AUTO: 0.42 K/MM3 (ref 0–0.1)
IMM GRANULOCYTES NFR BLD AUTO: 3 % (ref 0–1)
LYMPHOCYTES # BLD AUTO: 0.49 K/MM3 (ref 0.84–5.2)
LYMPHOCYTES NFR BLD AUTO: 4 % (ref 21–46)
MAGNESIUM SERPL-MCNC: 2.6 MG/DL (ref 1.6–2.4)
MCHC RBC AUTO-ENTMCNC: 34.8 G/DL (ref 31.5–36.5)
MCV RBC AUTO: 77 FL (ref 80–100)
MONOCYTES # BLD AUTO: 0.73 K/MM3 (ref 0.16–1.47)
MONOCYTES NFR BLD AUTO: 5 % (ref 4–13)
NEUTROPHILS # BLD AUTO: 11.99 K/MM3 (ref 1.96–9.15)
NEUTROPHILS NFR BLD AUTO: 86 % (ref 41–73)
NRBC # BLD AUTO: 0.02 K/MM3 (ref 0–0.02)
NRBC BLD AUTO-RTO: 0.1 /100 WBC (ref 0–0.2)
PHOSPHATE SERPL-MCNC: 9.1 MG/DL (ref 2.5–4.9)
PLATELET # BLD AUTO: 145 K/MM3 (ref 150–400)
POTASSIUM SERPL-SCNC: 5.3 MMOL/L (ref 3.5–5.5)
PROT SERPL-MCNC: 5.8 G/DL (ref 6.4–8.2)
PROTHROMBIN TIME: 13.6 SEC (ref 9.7–11.5)
PROTHROMBIN TIME: 13.8 SEC (ref 9.7–11.5)
SODIUM SERPL-SCNC: 131 MMOL/L (ref 136–145)
VANCOMYCIN TROUGH SERPL-MCNC: 15.1 UG/ML (ref 5–10)

## 2022-01-14 NOTE — NUR
Dr Man in to see patient. Provider stated to pull dobhoff out by one inch
and attempt flushing again. Dobhoff placement changed to 66 cm at nare. Still
did not flush. Line removed. Plan to replace when dialysis complete. Dialysis
RN aware that pt has not been able to take phosphate binders due to lack of
gastric access.

## 2022-01-14 NOTE — NUR
TOOK OVER CARE AT 0700 1/14/22. PT STABLE WITH CURRENT INTERVENTIONS IN PLACE-
SEE MAR. PT CURRENTLY TOLERATING VENTILATION WITH PRESSURE SUPPORT 10/5 30%
FiO2.

## 2022-01-14 NOTE — NUR
NOSEBLEED
 
Entered pt's room and noted that pt's left side of face and linens beneath
head were bloody. Source of bleeding appeared to be left nare. Large amount of
blood and golf-ball sized clot suctioned from oropharynx. Pt tachcardic. BP
lower than previously noted, however MAP remains stable with unchanged
vasopressor dosing. -125, atrial flutter. Dialysis continues. Dr Man
in room to see patient. Ordered 2 units FFP and DDAVP. Fibrinogen level to be
checked. Dr Erazo called to room to place Rhinorocket. Device placed and
bleeding immediatly slowed.

## 2022-01-14 NOTE — NUR
BEGINNING OF SHIFT
 
Assumed care of pt with Candie LEIGH at 0700. Report received from Melodie LEIGH.
Pt alert. Follows commands. Propofol off. 8.0 perc trach, aaliyahley, in place and
connected to ventilator. Vent settings PS 10/5 and 30% FiO2. SpO2 90% or
greater. RR 22-28. Tidal volumes 600 mL. Atrial flutter per monitor, rate 110.
BP stable with vasopressin at 0.04 units/min and levophed at 10 mcg/min.
Protonix drip in place.

## 2022-01-14 NOTE — NUR
ASSUMED CARE
ASSUMED CARE OF PATIENT.  REMAINS ON VENTILATOR- SPONTANEOUS PS 10, PEEP 5,
FIO2 30%.  RR 20S-30s.  TRACH SITE WITH SMALL AMOUNT OF OLD BLOODY DRAINAGE.
SX BLOODY SECRETIONS FROM ETT AND FROM MOUTH.  RHINO ROCKET IN PLACE TO LEFT
NARES- CONTINUES WITH SMALL AMOUNT OF DRAINAGE AROUND IT.  OPENS EYES TO
VERBAL STIMULI.  WIGGLES TOES TO COMMANDS AND ATTEMPTS TO WIGGLE FINGERS.
NODS HEAD YES/NO SEEMINGLY APPROPRIATELY.  DENIES C/O PAIN OR DISCOMFORT.
MONITOR SHOWS AFLUTTER, RATE 120s.  BP STABLE WITH LEVOPHED AT 6MCG/MIN.  HD
CATH NOTED TO RIGHT UPPER CHEST.  JM PICC LINE NOTED.  KRAFT PATENT AND
DRAINING SCANT ORANGISH-YELLOW URINE.  RECTAL TUBE IN PLACE WITH SMALL AMOUNT
OF BLACKISH-BROWN LIQUID STOOL.  SEE SHIFT ASSESSMENT FOR FULL ASSESSMENT.

## 2022-01-14 NOTE — NUR
Dr Man aware of blood sugar 72. Discussed that levophed is in D5W. Plan to
recheck blood sugar with next lab draw.

## 2022-01-14 NOTE — NUR
VASOPRESSIN STOPPED, LEVOPHED CONTINUED AT 6 MCG/MIN. MAP 77 ON RECHECK. DR. WHITE AND DR. KIM AT BEDSIDE TO SEE PT. DISCUSSED PLAN TO ULTRASOUND LIVER
IF BILIRUBIN CONTINUES TO BE ELEVATED AND POSSIBLE REPEAT EGD IF HEMAGLOBIN
DROPS FURTHER.

## 2022-01-14 NOTE — NUR
PT RESTING IN BED. STABLE WITH CURRENT INTERVENTIONS. LEVOPHED 4-6 DEPENDING
ON PT BEING AWAKE OR ASLEEP. VASOPRESSIN STILL OFF.
TOLERATING VENTILATION, SET ON SPONTANEOUS
PRESSURE SUPPORT 10/5, 30% FiO2. PT IS STILL JAUNDICE BUT BLE MOTTLING NOTABLY
DECREASED. ABSENT BOWEL SOUNDS.  PT WAS SEEN MOVING RIGHT FOREARM AS WELL AS
EASILY FOLLOWING
COMMANDS TO WIGGLE TOES, MOUTHING WORDS AND NODDING HEAD LATER IN THE SHIFT
. ALTHOUGH, PT ALERTNESS IS STILL LABILE. RHINO ROCKET REMAINS IN PLACE. NO
VISIBLE SIGNS OF CONTINUED BLEEDING AT THIS TIME.

## 2022-01-14 NOTE — NUR
END OF SHIFT SUMMARY
NOT ACUTE EVENTS OVERNIGHT. PT IS ABLE TO ANSWER Y/N QUESTIONS WITH HEAD NODS;
ATTEMPTS TO FOLLOW DIRECTIONS TO MOVE TOES AND FINGERS; PROFOUNDLY WEAK. PT
CONT TO BE INTUBATED VIA TRACH WITH VENT SETTINGS SPONT 10/5, FIO2 30%;
MODERATE AMOUNT OF THICK RED SECREATIONS FROM TRACH; DRESSING AROUND TRACH
CHANGED SEVERAL TIMES. AFEBRILE. . -120; LEVOPHED TITRATED DOWN
TO 12MCG/MIN; VASOPRESSIN INFUSING AT 0.04UNITS/MIN. DOBHOFF IN PLACE AND
CLAMPED; RECTAL TUBE IN PLACE WITH 150ML BLACK/BROWN LIQUID STOOL. KRAFT IN
PLACE WITH 100ML OUTPUT. DRESSING TO COCCYX CHANGED; WEEPING FROM SCROTAL
WOUND. WILL REPORT TO AM RN WHEN AVAILABLE.

## 2022-01-15 LAB
ALBUMIN SERPL BCP-MCNC: 1.6 G/DL (ref 3.4–5)
ALBUMIN/GLOB SERPL: 0.4 {RATIO} (ref 0.8–1.8)
ALT SERPL W P-5'-P-CCNC: 119 U/L (ref 12–78)
ANION GAP SERPL CALCULATED.4IONS-SCNC: 16 MMOL/L (ref 6–16)
AST SERPL W P-5'-P-CCNC: 156 U/L (ref 12–37)
BASOPHILS # BLD AUTO: 0.07 K/MM3 (ref 0–0.23)
BASOPHILS NFR BLD AUTO: 1 % (ref 0–2)
BILIRUB SERPL-MCNC: 20.4 MG/DL (ref 0.1–1)
BUN SERPL-MCNC: 119 MG/DL (ref 8–24)
CALCIUM SERPL-MCNC: 7.5 MG/DL (ref 8.5–10.1)
CHLORIDE SERPL-SCNC: 99 MMOL/L (ref 98–108)
CO2 SERPL-SCNC: 22 MMOL/L (ref 21–32)
CREAT SERPL-MCNC: 7.85 MG/DL (ref 0.6–1.2)
DEPRECATED RDW RBC AUTO: 47.5 FL (ref 35.1–46.3)
EOSINOPHIL # BLD AUTO: 0.25 K/MM3 (ref 0–0.68)
EOSINOPHIL NFR BLD AUTO: 2 % (ref 0–6)
ERYTHROCYTE [DISTWIDTH] IN BLOOD BY AUTOMATED COUNT: 19.3 % (ref 11.7–14.2)
GLOBULIN SER CALC-MCNC: 3.7 G/DL (ref 2.2–4)
GLUCOSE SERPL-MCNC: 70 MG/DL (ref 70–99)
HCT VFR BLD AUTO: 28.7 % (ref 37–53)
HGB BLD-MCNC: 10.2 G/DL (ref 13.5–17.5)
IMM GRANULOCYTES # BLD AUTO: 0.29 K/MM3 (ref 0–0.1)
IMM GRANULOCYTES NFR BLD AUTO: 3 % (ref 0–1)
LYMPHOCYTES # BLD AUTO: 0.55 K/MM3 (ref 0.84–5.2)
LYMPHOCYTES NFR BLD AUTO: 5 % (ref 21–46)
MAGNESIUM SERPL-MCNC: 2.6 MG/DL (ref 1.6–2.4)
MCHC RBC AUTO-ENTMCNC: 35.5 G/DL (ref 31.5–36.5)
MCV RBC AUTO: 77 FL (ref 80–100)
MONOCYTES # BLD AUTO: 0.68 K/MM3 (ref 0.16–1.47)
MONOCYTES NFR BLD AUTO: 7 % (ref 4–13)
NEUTROPHILS # BLD AUTO: 8.64 K/MM3 (ref 1.96–9.15)
NEUTROPHILS NFR BLD AUTO: 82 % (ref 41–73)
NRBC # BLD AUTO: 0 K/MM3 (ref 0–0.02)
NRBC BLD AUTO-RTO: 0 /100 WBC (ref 0–0.2)
PHOSPHATE SERPL-MCNC: 7 MG/DL (ref 2.5–4.9)
PLATELET # BLD AUTO: 126 K/MM3 (ref 150–400)
POTASSIUM SERPL-SCNC: 4.1 MMOL/L (ref 3.5–5.5)
PROT SERPL-MCNC: 5.3 G/DL (ref 6.4–8.2)
SODIUM SERPL-SCNC: 137 MMOL/L (ref 136–145)

## 2022-01-15 NOTE — NUR
ASSUMED CARE
ASSUMED CARE OF PATIENT.  REMAINS ON VENTILATOR- SPONTANEOUS PS 10, PEEP ,
FIO2 30%.  RR 20S.  TRACH SITE WITH SMALL AMOUNT OF OLD BLOODY DRAINAGE.
SX BLOODY SECRETIONS FROM ETT AND SMALL AMOUNT FROM MOUTH.  RHINO ROCKET IN
PLACE TO LEFT NARES.  OPENS EYES SPONTANEOUSLY.  WIGGLES TOES TO COMMANDS AND
SQUEEZED RIGHT HAND SLIGHTLY.  NODS HEAD YES/NO SEEMINGLY APPROPRIATELY.
DENIES C/O PAIN OR DISCOMFORT.  MONITOR SHOWS AFIB, RATE 110s.  BP STABLE
WITH LEVOPHED AT 3MCG/MIN.  HD CATH NOTED TO RIGHT UPPER CHEST.  JM PICC LINE
NOTED.  KRAFT PATENT AND DRAINING SCANT ORANGISH-YELLOW URINE.  RECTAL TUBE IN
PLACE WITH SMALL AMOUNT OF BROWN LIQUID STOOL.  SEE SHIFT ASSESSMENT FOR FULL
ASSESSMENT.

## 2022-01-15 NOTE — NUR
SHIFT SUMMARY
CONTINUES WITH MECHANICAL VENTILATION- SPONTANEOUS PS 10, PEEP 5, FIO2 30%.
RR 16-20s, MOSTLY.  CONTINUES TO FOLLOW SOME SIMPLE COMMANDS AND IS ABLE TO
NOD HEAD YES/NO APPROPRIATELY.  DENIES C/O PAIN/DISCOMFORT BY SHAKING HEAD NO
WHEN ASKED.  MINIMAL MOVEMENT NOTED IN BILATERAL UPPER EXTREMITIES.  MONITOR
SHOWS AFLUTTER, RATE 120s.  LEVOPHED AT 7MCG/MIN TO MAINTAIN MAP >65.
PROTONIX GTT CONTINUES PER ORDER.  RHINO ROCKET REMAIN IN PLACE TO LEFT NARES-
SLIGHT BLOODY DRAINAGE NOTED AROUND IT.  ALSO CONTINUES WITH BLOODY ORAL
SECRETIONS.  CBG CONTINUED TO FALL BELOW 70 DURING SHIFT- D50 1/2 AMP GIVEN X
2 AND 1 FULL AMP GIVEN X 1 DURING SHIFT.  CLINIMIX STARTED THIS AM PER DR. BUSTOS AT 75CC/HR UNTIL ABLE TO RESTART TUBE FEEDING.  KRAFT PATENT- DRAINED
75CC.  FLEXISEAL WITH SCANT DRAINAGE.  WILL REPORT TO ONCOMING RN WHEN
AVAILABLE.

## 2022-01-15 NOTE — NUR
SHIFT SUMMARY
PT REMAINS ALERT, ON THE VENTILATOR VIA TRACH. HE HAS CONTINUED TO ANSWER
YES/NO QUESTIONS THROUGHOUT THE DAY. LUNGS ARE COARSE AND WHEEZY. STILL
GETTING DARK BLOODY SECRETIONS FROM THE BACK OF HIS THROAT AND ETT.
AFIB/AFLUTTER WITH RATE FROM 110-120S TODAY. LEVOPHED WAS WEANED OFF THIS
MORNING, BUT PT BECAME HYPOTENSIVE AGAIN AFTER HE GOT BACK INTO BED FROM THE
CHAIR SO LEVOPHED HAD TO BE RESTARTED AND CONTINUED WHILE PT GOT DIALYSIS.
AFTER DIALYSIS FINISHED, LEVOPHED HAS BEEN ABLE TO BE TITRATED BACK DOWN SOME.
30ML OF DARK URINE FROM CATHETER, SCANT OUTPUT FROM RECTAL TUBE. PT RECEIVED
HIS BATH, COCCYX DRESSING CHANGED. INNER CANNULA CHANGED AND DRESSING AROUND
TRACH CHANGED AS WELL. PT'S SON VISITED TODAY AND WAS UPDATED. CONTINUING TO
MONITOR.

## 2022-01-15 NOTE — NUR
REASSESSMENT
PT IS ALERT, ON THE VENTILATOR VIA TRACH, SPONTANEOUS WITH PS 10, PEEP 5. ABLE
TO ANSWER YES/NO QUESTIONS.  HIS LUNGS ARE COARSE AND WHEEZY. PT APPEARS TO BE
SWALLOWING SOME SECRETIONS. STILL GETTING LARGE AMT OF BLOOD WHEN SUCTIONING
THE BACK OF HIS THROAT EVERY COUPLE OF HOURS AND MODERATE AMT WHEN SUCTIONING
TRACH. DR. WHITE REMOVED AIR FROM RHINO ROCKET AT 0925 AND THERE DOES NOT
APPEAR TO BE A CHANGE IN THE AMOUNT OF BLOODY SECRETIONS. DR. WHITE GAVE
ISNTRUCTIONS TO LEAVE RHINO ROCKET DEFLATED, BUT IN PLACE FOR TODAY. HR IS
TACHY IN THE 120S, AFLUTTER. LEVOPHED TITRATED OFF THROUGHOUT THE MORNING.
REMAINS VERY EDEMATOUS. PT UP IN CHAIR THIS MORNING WITH ASSISTANCE OF CEILING
LIFT. HE IS EXTREMEMLY WEAK, ABLE TO SHRUG HIS SHOULDERS, BUT CAN'T LIFT UP
HIS HANDS, MOVES HIS FEET ENOUGH TO WIGGLE THEM OFF THE PILLOWS THEY ARE
PROPPED ON. SPOKE WITH PT'S MELISA SMITH AND PROVIDED UPDATE. CONTINUE TO
MONITOR.

## 2022-01-15 NOTE — NUR
HYPOGLYCEMIA
CBG 53.  PT OPENS EYES TO VERBAL STIMULI AND DOES FOLLOW SOME SIMPLE COMMANDS.
D50 1/2 AMP GIVEN IV AT THIS TIME PER HYPOGLYCEMIA PROTOCOL.

## 2022-01-16 LAB
ALBUMIN SERPL BCP-MCNC: 1.6 G/DL (ref 3.4–5)
ANION GAP SERPL CALCULATED.4IONS-SCNC: 12 MMOL/L (ref 6–16)
BASOPHILS # BLD AUTO: 0.07 K/MM3 (ref 0–0.23)
BASOPHILS NFR BLD AUTO: 1 % (ref 0–2)
BUN SERPL-MCNC: 87 MG/DL (ref 8–24)
CALCIUM SERPL-MCNC: 7.8 MG/DL (ref 8.5–10.1)
CHLORIDE SERPL-SCNC: 103 MMOL/L (ref 98–108)
CO2 SERPL-SCNC: 25 MMOL/L (ref 21–32)
CREAT SERPL-MCNC: 6.58 MG/DL (ref 0.6–1.2)
DEPRECATED RDW RBC AUTO: 49.8 FL (ref 35.1–46.3)
EOSINOPHIL # BLD AUTO: 0.37 K/MM3 (ref 0–0.68)
EOSINOPHIL NFR BLD AUTO: 4 % (ref 0–6)
ERYTHROCYTE [DISTWIDTH] IN BLOOD BY AUTOMATED COUNT: 20 % (ref 11.7–14.2)
GLUCOSE SERPL-MCNC: 88 MG/DL (ref 70–99)
HCT VFR BLD AUTO: 28.6 % (ref 37–53)
HGB BLD-MCNC: 9.9 G/DL (ref 13.5–17.5)
IMM GRANULOCYTES # BLD AUTO: 0.32 K/MM3 (ref 0–0.1)
IMM GRANULOCYTES NFR BLD AUTO: 3 % (ref 0–1)
LYMPHOCYTES # BLD AUTO: 0.49 K/MM3 (ref 0.84–5.2)
LYMPHOCYTES NFR BLD AUTO: 5 % (ref 21–46)
MAGNESIUM SERPL-MCNC: 2.5 MG/DL (ref 1.6–2.4)
MCHC RBC AUTO-ENTMCNC: 34.6 G/DL (ref 31.5–36.5)
MCV RBC AUTO: 78 FL (ref 80–100)
MONOCYTES # BLD AUTO: 0.58 K/MM3 (ref 0.16–1.47)
MONOCYTES NFR BLD AUTO: 6 % (ref 4–13)
NEUTROPHILS # BLD AUTO: 7.99 K/MM3 (ref 1.96–9.15)
NEUTROPHILS NFR BLD AUTO: 81 % (ref 41–73)
NRBC # BLD AUTO: 0.02 K/MM3 (ref 0–0.02)
NRBC BLD AUTO-RTO: 0.2 /100 WBC (ref 0–0.2)
PHOSPHATE SERPL-MCNC: 5.5 MG/DL (ref 2.5–4.9)
PLATELET # BLD AUTO: 125 K/MM3 (ref 150–400)
POTASSIUM SERPL-SCNC: 3.5 MMOL/L (ref 3.5–5.5)
SODIUM SERPL-SCNC: 140 MMOL/L (ref 136–145)

## 2022-01-16 NOTE — NUR
SHIFT SUMMARY
PT GOT UP TO THE CHAIR WITH THE LIFT TWICE TODAY AND STAYED UP FOR 2 HOURS. HE
WORE THE TRACH COLLAR IN THE MORNING FOR 2 HOURS AND IN THE AFTERNOON FOR 35
MINUTES BEFORE HE GOT TIRED AND ASKED TO GO BACK ON THE VENT. HIS LUNGS ARE
COARSE BEFORE SUCTIONING, CLEAR AFTER. LARGE AMT OF THICK, WELLS/BLOODY SPUTUM.
REMAIN IN AFIB WITH RATE MOSTLY IN THE 120S THIS AFTERNOON. LEVOPHED HAS BEEN
OFF SINCE THIS AM. SM AMT OF DARK URINE IN KRAFT BAG. LIQUID BROWN STOOL IN
RECTAL TUBE. PT'S SISTER CAME BY TODAY AND WAS UPDATED. CONTINUING TO MONITOR.

## 2022-01-16 NOTE — NUR
SHIFT SUMMARY
NO ACUTE CHANGES T/O NOC.  VENT SETTINGS UNCHANGED.  PT SLEPT INTERMITTENTLY.
CONTINUES TO SPONTANOUSLY OPEN EYES AND NOD HEAD YES/NO APPROPRIATELY.  DENIES
PAIN/DISCOMFORT, BUT DOES GRIMACE AT TIMES WITH REPOSITIONING AND ORAL CARE.
LEVOPHED AT 3MCG/MIN T/O SHIFT TO MAINTAIN MAP >65.  MONITOR SHOWS AFIB, RATE
110-120s.  TMAX 100.0F.  RHINO ROCKET IN PLACE TO LEFT NARES- NO FURTHER
DRAINAGE NOTED.  SMALL AMOUNT OF BLOODY ORAL SECRETIONS NOTED WITH DEEP
SUCTIONING.  KRAFT PATENT- DRAINED 33CC ORANGE URINE.  RECTAL TUBE IN PLACE.
CLINIMIX CONTINUES AT 75CC/HR PER ORDER.  PROTONIX GTT STOPPED AND CHANGED TO
IV BID PER ORDER.  WILL REPORT TO ONCOMING RN WHEN AVAILABLE.

## 2022-01-16 NOTE — NUR
PATIENT RESTING QUIETLY IN BED AWAKENS TO SLIGHT STIMULI. TRACH MIDLINE WITH
VENT SPONT 10/5 FIO2 30% SUCTIONING BLOOD TINGED SPUTUM. DEEP ORAL SUCTION
OBTAINED DARK BLOODY SPUTUM. GENERALIZED WEAKNESS ONLY ABLE TO MOVE
EXTREMITIES SLIGHTLY. DIALYSIS PERMA CATH TO RIGHT CHEST WITH DRESSING CD&I.
RECTAL TUBE REMAINS IN PLACE DRAINING LIQUID BLACK/BROW STOOL. GENERALIZED
EDEMA CONTINUES.

## 2022-01-16 NOTE — NUR
REASSESSMENT
PT GOT UP TO THE CHAIR WITH THE LIFT THIS MORNING AND WAS PUT ON TRACH COLLAR
AT THAT TIME. PT TOLERATED BEING UP WITH TRACH COLLAR FOR 2 HOURS. PT HAD
PHYSICAL THERAPY DURING THIS TIME AND AFTER THE 2 HOURS PT LOOKED TIRED AND
STATED THAT AS WELL SO HE WAS PUT BACK IN BED AND SWITCHED BACK TO VENTILATOR.
LUNGS ARE CLEAR AFTER SUCTIONING, GETTING LARGE AMT OF THICK WELLS/BLOOD
SECRETIONS. MODERATE AMT OF BLOOD OUT OF BACK OROPHARYNX THIS MORNING, SIMILAR
TO YESTERDAY. RHINOROCKET REMOVED PER DR. WHITE'S ORDERS WITHOUT ANY
COMPLICATIONS. NO NEW BLEEDING FROM NARES. PT REMAINS IN AFIB WTIH RATE
BETWEEN LOW 100S . LEVOPHED ABLE TO BE TITRATED OFF. PT HAS OCCASIONAL
SBP IN THE 70S, BUT MAP IS ABOVE 65MMHG AND SBP IMPROVES WITH THE NEXT BP.

## 2022-01-16 NOTE — NUR
Report given to Javad LEIGH. Pt condition unchanged from assumption of care.
Vent settings unchanged, VS stable. See shift assessment for further details.

## 2022-01-16 NOTE — NUR
Assumed care at approximately 2030. Report received from Deanna LEIGH. PT in bed,
ventilated via trach. Vent settings: PS 10/5, 30%. Dialysis catheter in
R/anterior chest. PICC in JM. IV access in R/arm. Clinimix running at 75
ml/hr. Nunes in place. Rectal tube in place. Pt alert, able to nod yes and no
to questions. No acute needs at this time, will continue to monitor.

## 2022-01-17 LAB
ALBUMIN SERPL BCP-MCNC: 1.5 G/DL (ref 3.4–5)
ALBUMIN/GLOB SERPL: 0.4 {RATIO} (ref 0.8–1.8)
ALT SERPL W P-5'-P-CCNC: 104 U/L (ref 12–78)
ANION GAP SERPL CALCULATED.4IONS-SCNC: 14 MMOL/L (ref 6–16)
AST SERPL W P-5'-P-CCNC: 156 U/L (ref 12–37)
BASOPHILS # BLD AUTO: 0.07 K/MM3 (ref 0–0.23)
BASOPHILS NFR BLD AUTO: 1 % (ref 0–2)
BILIRUB DIRECT SERPL-MCNC: 17.3 MG/DL (ref 0–0.3)
BILIRUB INDIRECT SERPL-MCNC: 2.6 MG/DL (ref 0.1–0.7)
BILIRUB SERPL-MCNC: 19.9 MG/DL (ref 0.1–1)
BUN SERPL-MCNC: 112 MG/DL (ref 8–24)
CALCIUM SERPL-MCNC: 7.7 MG/DL (ref 8.5–10.1)
CHLORIDE SERPL-SCNC: 103 MMOL/L (ref 98–108)
CO2 SERPL-SCNC: 23 MMOL/L (ref 21–32)
CREAT SERPL-MCNC: 7.79 MG/DL (ref 0.6–1.2)
DEPRECATED RDW RBC AUTO: 51.5 FL (ref 35.1–46.3)
EOSINOPHIL # BLD AUTO: 0.36 K/MM3 (ref 0–0.68)
EOSINOPHIL NFR BLD AUTO: 4 % (ref 0–6)
ERYTHROCYTE [DISTWIDTH] IN BLOOD BY AUTOMATED COUNT: 21.3 % (ref 11.7–14.2)
GLOBULIN SER CALC-MCNC: 3.6 G/DL (ref 2.2–4)
GLUCOSE SERPL-MCNC: 101 MG/DL (ref 70–99)
HCT VFR BLD AUTO: 29.6 % (ref 37–53)
HGB BLD-MCNC: 10.1 G/DL (ref 13.5–17.5)
IMM GRANULOCYTES # BLD AUTO: 0.42 K/MM3 (ref 0–0.1)
IMM GRANULOCYTES NFR BLD AUTO: 4 % (ref 0–1)
LYMPHOCYTES # BLD AUTO: 0.66 K/MM3 (ref 0.84–5.2)
LYMPHOCYTES NFR BLD AUTO: 7 % (ref 21–46)
MAGNESIUM SERPL-MCNC: 2.7 MG/DL (ref 1.6–2.4)
MCHC RBC AUTO-ENTMCNC: 34.1 G/DL (ref 31.5–36.5)
MCV RBC AUTO: 80 FL (ref 80–100)
MONOCYTES # BLD AUTO: 0.55 K/MM3 (ref 0.16–1.47)
MONOCYTES NFR BLD AUTO: 6 % (ref 4–13)
NEUTROPHILS # BLD AUTO: 7.75 K/MM3 (ref 1.96–9.15)
NEUTROPHILS NFR BLD AUTO: 79 % (ref 41–73)
NRBC # BLD AUTO: 0 K/MM3 (ref 0–0.02)
NRBC BLD AUTO-RTO: 0 /100 WBC (ref 0–0.2)
PHOSPHATE SERPL-MCNC: 6.4 MG/DL (ref 2.5–4.9)
PLATELET # BLD AUTO: 133 K/MM3 (ref 150–400)
POTASSIUM SERPL-SCNC: 3.9 MMOL/L (ref 3.5–5.5)
PROT SERPL-MCNC: 5.1 G/DL (ref 6.4–8.2)
SODIUM SERPL-SCNC: 140 MMOL/L (ref 136–145)
VANCOMYCIN SERPL-MCNC: 9.1 UG/ML

## 2022-01-17 NOTE — NUR
END OF SHIFT SUMMARY
NO SIGNIFICANT CHANGES. VENT SETTINGS REMAIN UNCHANGED. LEVOPHED INCREASE TO
3MCG/MIN WITH MAP'S 60-65. CLINIMIX INFUSING AT 75MLS/HR. DRESSING CHANGED TO
UNSTAGEABLE PRESSURE ULCER TO COCCYX/SACRUM; CALCIUM ALGINATE APPLIED AND
COVERED WITH MAX-ABSORB DRESSING. PT MAY NEED CHEMICAL DEBRIDEMENT AS PT HAS A
LARGE AREA OF NECROTIC/ESCHAR TISSUE. ATTEMPTED TO KEEP PT OFFLOADED FROM
BUTTOCKS; HOWEVER, CONSIDERING BODY HABITUS; PT IS VERY HARD TO KEEP OFF
FRAGILE AREA D/T PILLOWS DEFLATING.
NO NOSE BLEEDS THIS SHIFT. TMAX 101.3. BLANKETS REMOVED AND PT NOW .8.
PT REMAINS WITHOUT A DOBHOFF AT THIS TIME. WILL CONTINUE TO MONITOR UNTIL
REPORT IS HANDED OFF TO ONCOMING RN.

## 2022-01-17 NOTE — NUR
PATIENT RESTING QUIETLY OPENS EYES TO VERBAL STIMULI, NODDING YES AND NO TO
SIMPLE QUESTIONS, ONLY SLIGHT MOVEMENT TO EXTREMITIES. TRACH IN PLACE WITH
VENT SPONT 10/5 FIO2 30% SUCTIONING DARK RED BLOODY ORAL SECRETIONS WITH DEEP
ORAL SUCTIONING, AND SMALL AMT OF TAN BLOOD TINGED SPUTUM VIA TRACH. DOBHOFF
IN PLACE TO RIGHT NARE WITH VITAL HP AT 25 CC/HR. RECTAL TUBE REMAINS IN PLACE
DRAINING LIQUID BLACK/BROWN STOOL. KRAFT DRAINING SMALL AMT OF YELLOW/ORANGE
URINE. GENERALIZED EDEMA CONTINUES WITH JAUNDICE, SCROTAL EDEMA WITH SOME
SERIOUS DRAINAGE FROM WOUND ON SCROTUM.

## 2022-01-17 NOTE — NUR
Wound care recommendation; Surgical consult for debridement. Cleanse wound
with NS, pat dry, apply calcium alginate to wound bed, cover with absorbent
dressing. Change daily, soiled or dislodged

## 2022-01-17 NOTE — NUR
SHIFT SUMMARY:
Pt up in chair for about four hours this afternoon. Trach collar in place from
about 1435 to 1804. Levophed currently at 8. Wound care nurse consulted for
wound on coccyx; she recommended surgical consult for debridment. Dobhoff
replaced x 2 today in right nare; pt gagged up first tube while guidewire was
being pulled. PRN ativan given during placement. Vital high protein tube
feeds started at 25 ml/hr; goal rate is 65. Dialysis today with 5L off.

## 2022-01-17 NOTE — NUR
ASSUMED PT CARE FROM REESE SOLANO AT 2340
PT LYING IN BED. PT ON VENT VIA TRACH; SPONTANEOUS WITH PS 10/5; FIO2 30%, RR
16, SPO2 >90%. PT ABLE TO OPEN EYES TO VERBAL STIMULI, TRACK, NODS HEAD YES/NO
TO QUESTIONS, AND FOLLOWS COMMANDS. PT NOTED TO BE SINUS TACHYCARDIA WITH HR
120'S. PT HYPOTENSIVE EARLIER THIS SHIFT WITH LEVOPHED BEING RESTARTED AT
2MCG/MIN. NS TKO. CLINIMIX INFUSING AT 75MLS/HR. PICC TO LEFT UPPER ARM. 18G
TO RIGHT AC. HD CATHETER TO RIGHT ANTERIOR CHEST WALL. RECTAL TUBE AND KRAFT
CATHETER IN PLACE AND PATENT/DRAINING TO GRAVITY. PT REMAINS VERY EDEMATOUS
AND JAUNDICED. SEE SHIFT SUMMARY FOR FURTHER DETAILS.

## 2022-01-18 LAB
ALBUMIN SERPL BCP-MCNC: 1.6 G/DL (ref 3.4–5)
ANION GAP SERPL CALCULATED.4IONS-SCNC: 14 MMOL/L (ref 6–16)
BASOPHILS # BLD AUTO: 0.07 K/MM3 (ref 0–0.23)
BASOPHILS NFR BLD AUTO: 1 % (ref 0–2)
BUN SERPL-MCNC: 99 MG/DL (ref 8–24)
CALCIUM SERPL-MCNC: 8.1 MG/DL (ref 8.5–10.1)
CHLORIDE SERPL-SCNC: 100 MMOL/L (ref 98–108)
CO2 SERPL-SCNC: 23 MMOL/L (ref 21–32)
CREAT SERPL-MCNC: 6.64 MG/DL (ref 0.6–1.2)
DEPRECATED RDW RBC AUTO: 52.6 FL (ref 35.1–46.3)
EOSINOPHIL # BLD AUTO: 0.62 K/MM3 (ref 0–0.68)
EOSINOPHIL NFR BLD AUTO: 6 % (ref 0–6)
ERYTHROCYTE [DISTWIDTH] IN BLOOD BY AUTOMATED COUNT: 22.5 % (ref 11.7–14.2)
GLUCOSE SERPL-MCNC: 133 MG/DL (ref 70–99)
HCT VFR BLD AUTO: 28.6 % (ref 37–53)
HGB BLD-MCNC: 9.9 G/DL (ref 13.5–17.5)
IMM GRANULOCYTES # BLD AUTO: 0.43 K/MM3 (ref 0–0.1)
IMM GRANULOCYTES NFR BLD AUTO: 5 % (ref 0–1)
LYMPHOCYTES # BLD AUTO: 0.53 K/MM3 (ref 0.84–5.2)
LYMPHOCYTES NFR BLD AUTO: 6 % (ref 21–46)
MAGNESIUM SERPL-MCNC: 2.3 MG/DL (ref 1.6–2.4)
MCHC RBC AUTO-ENTMCNC: 34.6 G/DL (ref 31.5–36.5)
MCV RBC AUTO: 80 FL (ref 80–100)
MONOCYTES # BLD AUTO: 0.51 K/MM3 (ref 0.16–1.47)
MONOCYTES NFR BLD AUTO: 5 % (ref 4–13)
NEUTROPHILS # BLD AUTO: 7.48 K/MM3 (ref 1.96–9.15)
NEUTROPHILS NFR BLD AUTO: 78 % (ref 41–73)
NRBC # BLD AUTO: 0 K/MM3 (ref 0–0.02)
NRBC BLD AUTO-RTO: 0 /100 WBC (ref 0–0.2)
PHOSPHATE SERPL-MCNC: 5 MG/DL (ref 2.5–4.9)
PLATELET # BLD AUTO: 134 K/MM3 (ref 150–400)
POTASSIUM SERPL-SCNC: 3.6 MMOL/L (ref 3.5–5.5)
SODIUM SERPL-SCNC: 137 MMOL/L (ref 136–145)
VANCOMYCIN SERPL-MCNC: 17.9 UG/ML

## 2022-01-18 PROCEDURE — 0JB70ZZ EXCISION OF BACK SUBCUTANEOUS TISSUE AND FASCIA, OPEN APPROACH: ICD-10-PCS | Performed by: SURGERY

## 2022-01-18 NOTE — NUR
CBG 60, VITAL HP TUBE FEEDING INCREASED TO 35 CC/HR AND APPLE JUICE 118 CC
GIVEN VIA DOBHOFF. WILL CONTINUE TO MONITOR GLUCOSE CLOSELY.

## 2022-01-18 NOTE — NUR
INITIAL ASSESSMENT
  PATIENT APPEARED TO BE SLEEPING IN BED UPON ENTERING ROOM.  PATIENT OPENS
EYES TO VERBAL STIMULI.  PATIENT DOES NOT FOLLOW ANY SIMPLE COMMANDS.  PATIENT
MOVING R ARM BUT NOT PURPOSEFULLY.  PATIENT NOT MOVING ANY OTHER EXTREMITIES.
SLCERA JAUNDICED.  NO SIGNS OF PAIN NOTED.  PATIENT HAS TEMP .1 DEGREES
FAHRENHEIT.  FAN AND ICE PLACED ON PATIENT.  LUNGS CLEAR TO AUSCULTATION AFTER
SUCTIONING TRACH.  PATIENT ON SPONTANEOUS VENT SETTINGS OF 10/5 AND 30% FIO2.
MODERATE AMOUNT OF THICK, BROWN SPUTUM SUCTIONED FROM TRACH.  PATIENT IN
JUNCTIONAL TACHYCARDIA, HR IN THE 120S.  SBP 80S TO LOW 100S.  LEVOPHED AT 5
MCG/ MINUTE.  SCDS IN PLACE.  PATIENT EDEMATOUS.  ABDOMEN MODERATELY
DISTENDED, SOFT, WITH HYPOACTIVE BOWEL SOUNDS NOTED.  RECTAL TUBE IN PLACE
DRAINING DARK BROWN, LIQUID STOOL.  DOBHOFF AT 75 CM AT R NARE.  TF ON HOLD
SINCE 0300 FOR POSSIBLE WOUND DEBRIDEMENT TODAY.  KRAFT DRAINING MINIMAL
AMOUNT OF DEANA COLORED URINE.  DIALYSIS PATIENT.  SCATTERED BRUISES NOTED.
SKIN JAUNDICED.  PRESSURE ULCER TO COCCYX AND R EAR.  SCROTUM SWOLLEN AND
WEEPING FROM AVULSION.  NS INFUSING TKO.  BED LOW, CALL LIGHT IN REACH.  WILL
CONTINUE TO MONITOR PATIENT FREQUENTLY THROUGHOUT SHIFT.

## 2022-01-18 NOTE — NUR
SHIFT SUMMARY
  PATIENT REMAINED RESPONDING TO VERBAL STIMULI.  PATIENT MORE AWAKE AT TIMES
THAN OTHERS.  AT BEST, PATIENT WAS ABLE TO MOVE ALL EXTREMITIES BUT LEFT LEG
AND FOOT AND WAS ABLE TO NOD AND SHAKE HEAD TO ANSWER YES AND NO QUESTIONS.
PATIENT HAD TMAX .3 DEGREES FAHRENHEIT THIS SHIFT; PATIENT HAS SINCE
COME DOWN.  PATIENT DENIED PAIN THIS SHIFT AND DID NOT HAVE ANY SIGNS OF PAIN.
 LUNGS REMAINED CLEAR TO AUSCULTATION AFTER SUCTIONING OF TRACH.  MODERATE
AMOUNT OF THICK, BROWN SPUTUM BEING SUCTIONED FROM TRACH.  PATIENT REMAINED ON
SPONTANEOUS PRESSURE SUPPORT 10/5 MOST OF THE DAY BUT IS NOW AT 5/5 AND 30%
FIO2.  PATIENT REMAINED IN JUNCTIONAL TACHYCARDIA, HR 1-TEENS TO 130S.  SBP
80S TO 1-TEENS.  PATIENT REMAINS EDEMATOUS.  TF RESTARTED AFTER TODAY'S
PROCEDURE.  RECTAL TUBE DRAINED 200 MLS OF DARK BROWN, LIQUID STOOL THIS
SHIFT.  KRAFT DRAINED 50 MLS OF DEANA COLORED URINE.  NO DIALYSIS TODAY.  NO
CHANGES TO SKIN EXCEPT PATIENT TAKEN TO HAVE WOUND ON SACRUM/ COCCYX DEBRIDED
BY DR. DICKERSON.  WOUND VAC ALSO APPLIED.  LEVOPHED CURRENTLY AT 2 MCG/ MINUTE.
NS INFUSING TKO.  PATIENT GIVEN D50 FOR LOW BLOOD SUGARS BEFORE PROCEDURE.
PHYSICAL THERAPY WORKED WITH PATIENT TODAY.  COMPLETE BED BATH PERFORMED.  BED
LOW, CALL LIGHT IN REACH.  PATIENT APPEARS COMFORTABLE AT THIS TIME.  REPORT
WILL BE GIVEN TO ONCOMING NIGHT SHIFT NURSE SHORTLY.

## 2022-01-18 NOTE — NUR
SUMMARY
PATIENT SLEEPING OFF AND ON T/O NIGHT, OPENS EYES TO SLIGHT STIMULI, NODDING
YES AND NO TO QUESTIONS AT TIMES SLOW TO ANSWER. GENERALIZED WEAKNESS MOVING
RIGHT ARM MORE THAN LEFT. TRACH REMAINS IN PLACE WITH VENT PS 1/5 FIO2 30% T/O
NIGHT SUCTIONING BLOOD TINGED SPUTUM VIA TRACH. DARK BLOOD WITH DEEP ORAL
SUCTION. DOBHOFF CLAMPED AT 0300 FOR POSSIBLE DEBRIDEMENT OF COCCYX WOUND
TODAY WITH DOCTOR JAYLA. RECTAL TUBE REMAINS IN PLACE DRAINING BLACK/BROWN
LIQUID STOOL. KRAFT DRAINING VERY SMALL AMT OF YELLOW/ORANGE URINE.
HEMODIALYSIS PERMACATH TO RIGHT CHEST WALL. HYPOTENSION CONTINUES LEVOPHED
TITRATED TO 5 MCG. CARDIAC MONITOR SHOWING IN AND OUT OF AFIB, WITH HEART RATE
120'S T/O NIGHT. GENERALIZED EDEMA CONTINUES WITH WEEPING YELLOW SEROUS TO
SCROTUM.

## 2022-01-18 NOTE — NUR
PATIENT AFEBRILE.  HR IN THE 120S.  SBP IN THE LOW 100S.  LEVOPHED INFUSING AT
2 MCG/ MINUTE.  TF INFUSING AT 25 MLS/ HOUR WITH GOAL RATE OF 65 MLS/ HOUR.
WOUND VAC TO DEBRIDED WOUND ON COCCYX/ SACRUM.  NO OTHER ACUTE CHANGES TO NOTE
ON AT THIS TIME.  WILL CONTINUE TO MONITOR.

## 2022-01-18 NOTE — NUR
PATIENT RESTING QUIETLY, TRACH MIDLINE WITH VENT SPONT 5/5 FIO2 30% SUCTIONING
THICK TAN SPUTUM VIA TRACH, DEEP ORAL SUCTION HAS DARK BLOOD TINGED
SECRETIONS. DOBHOFF IN PLACE WITH VITAL HP AT 25 CC/HR PLAN TO TITRATE AT 2330
IF CONTINUES TO HAKEEM TUBE FEEDING. RECTAL TUBE IN PLACE DRAINING DARK
BROWN/BLACK LIQUID STOOL. DRESSING TO COCCYX WITH WOUND VAC HOLDING GOOD
SUCTION. SCROTUM WITH WEEPING EDEMA CONTINUES. JAUNDICE AND GENERALIZED
PITTING EDEMA CONTINUES. LEVOPHED TITRATED BACK UP TO 4 MCG DUE TO
HYPOTENSION.

## 2022-01-18 NOTE — NUR
PT TRANSPORTED TO OR 2 FROM ICU 4. RT AT BEDSIDE TO MANAGE AIRWAY. TRAVEL
MONITOR IN PLACE. DR. RIVAS WITH PATIENT DURING TRANSPORT.

## 2022-01-18 NOTE — NUR
01/18/22 1517 Cortney Oshea
PATIENT ON SCHEDULED ANTIBIOTICS. RECEIVED CEFEPIME 1 G 01/17 @ 1247.
RECEIVED VANCOMYCIN 1.25G 01/17 @ 1500.

## 2022-01-18 NOTE — NUR
PATIENT HAS TEMP .2 DEGREES FAHRENHEIT.  PATIENT NOW ABLE TO ANSWER
YES AND NO QUESTIONS WITH NODDING AND SHAKING OF HEAD.  PATIENT ABLE TO
SQUEEZE WITH BILAT HANDS AND WIGGLE R TOES.  PATIENT UNABLE TO MOVE LEFT FOOT
AND LEG.  HR IN THE 120S.  SBP LOW 100S TO 1-TEENS.  LEVOPHED AT 4 MCG/
MINUTE.  NO OTHER ACUTE CHANGES TO NOTE ON AT THIS TIME.  WILL CONTINUE TO
MONITOR.

## 2022-01-19 LAB
ALBUMIN SERPL BCP-MCNC: 1.5 G/DL (ref 3.4–5)
ANION GAP SERPL CALCULATED.4IONS-SCNC: 15 MMOL/L (ref 6–16)
BASOPHILS # BLD AUTO: 0.06 K/MM3 (ref 0–0.23)
BASOPHILS NFR BLD AUTO: 1 % (ref 0–2)
BUN SERPL-MCNC: 110 MG/DL (ref 8–24)
CALCIUM SERPL-MCNC: 8.1 MG/DL (ref 8.5–10.1)
CHLORIDE SERPL-SCNC: 99 MMOL/L (ref 98–108)
CO2 SERPL-SCNC: 22 MMOL/L (ref 21–32)
CREAT SERPL-MCNC: 7.8 MG/DL (ref 0.6–1.2)
DEPRECATED RDW RBC AUTO: 54.3 FL (ref 35.1–46.3)
EOSINOPHIL # BLD AUTO: 0.8 K/MM3 (ref 0–0.68)
EOSINOPHIL NFR BLD AUTO: 9 % (ref 0–6)
ERYTHROCYTE [DISTWIDTH] IN BLOOD BY AUTOMATED COUNT: 23.5 % (ref 11.7–14.2)
GLUCOSE SERPL-MCNC: 93 MG/DL (ref 70–99)
HCT VFR BLD AUTO: 29.6 % (ref 37–53)
HGB BLD-MCNC: 10.3 G/DL (ref 13.5–17.5)
IMM GRANULOCYTES # BLD AUTO: 0.3 K/MM3 (ref 0–0.1)
IMM GRANULOCYTES NFR BLD AUTO: 3 % (ref 0–1)
LYMPHOCYTES # BLD AUTO: 0.6 K/MM3 (ref 0.84–5.2)
LYMPHOCYTES NFR BLD AUTO: 7 % (ref 21–46)
MAGNESIUM SERPL-MCNC: 2.4 MG/DL (ref 1.6–2.4)
MCHC RBC AUTO-ENTMCNC: 34.8 G/DL (ref 31.5–36.5)
MCV RBC AUTO: 80 FL (ref 80–100)
MONOCYTES # BLD AUTO: 0.56 K/MM3 (ref 0.16–1.47)
MONOCYTES NFR BLD AUTO: 6 % (ref 4–13)
NEUTROPHILS # BLD AUTO: 6.68 K/MM3 (ref 1.96–9.15)
NEUTROPHILS NFR BLD AUTO: 74 % (ref 41–73)
NRBC # BLD AUTO: 0.02 K/MM3 (ref 0–0.02)
NRBC BLD AUTO-RTO: 0.2 /100 WBC (ref 0–0.2)
PHOSPHATE SERPL-MCNC: 5.7 MG/DL (ref 2.5–4.9)
PLATELET # BLD AUTO: 122 K/MM3 (ref 150–400)
POTASSIUM SERPL-SCNC: 3.8 MMOL/L (ref 3.5–5.5)
SODIUM SERPL-SCNC: 136 MMOL/L (ref 136–145)
VANCOMYCIN SERPL-MCNC: 13.4 UG/ML

## 2022-01-19 NOTE — NUR
END OF SHIFT NOTE.
PATIENT STILL HAVING A FEVER 100.9 F. COOL CLOTHES APPLIED. SLIGHT BEDDING AND
COOL TEMP TO ROOM. PATIENT HAD GOOD STOOL OUTPUT AND 100ML OF URINE OUT
TODAY. LEVOPHED LEFT AT 4MCG/MIN. VANCO GIVEN TODAY. WOUND VAC IN PLACE TO
120 MHZ SUCT. SCAN OUT. WILL GIVE REPORT TO NEXT SHIFT

## 2022-01-19 NOTE — NUR
PT'S WOUND VAC BEGAN HISSING AND ALARMING THERE IS A LEAK AFTER THIS LAST TURN
TO THE LEFT. ASSISTANCE OBTAINED AND PT TURNED FURTHER TO EVALUATE. INDEED THE
CLEAR BANDAGE HAD RIDDEN OFF THE SKIN, AN ABD AND OPSITE DRESSING WERE APPLIED
TO REINFORCE THE DRESSING AND THE WOUND VAC BEGAN "THERAPY" AGAIN. PT
CONTINUES WITH DIAPHORETIC SKIN. ATTEMPTING TO KEEP DRESSING ON AND INTACT FOR
OPTIMAL DRAINAGE.

## 2022-01-19 NOTE — NUR
PROGRESS NOTE
ASSUMED CARE OF PATIENT THIS AM. JAIME WAS HAD DIALYSIS TODAY WITH 5L OFF. HIS
LEVOPHED STARTED AT 7 MCG BUT IS DOWN TO 4 MCG CURRENTLY. JAIME HAS HAD THE
WOUND VAC TO HER BUTTOCK AND SECURE. HE HAS BEEN TO CT FOR HIS HEAD AND CHEST
WITH CONTRAST TODAY. AWAITING RESULTS. ULTRASOUND OF LE BILAT. COMPLETE TODAY
AS WELL R/O DVT'S. TRACH CARE COMPLETED. HE HAS NOT PUT OUT MUCH SPUTUM FROM
TRACH BUT WHAT HE DID EXPECTORATE WAS CREAMY WELLS IN COLOR. HE IS STILL
WEEPING FROM HIS SWOLLEN SCROTUM. ELVIA IS EDEMATOUS 3+ ALL EXTREMETIES.
NO ACUTE NEEDS AT THIS TIME. WILL CONTINUE CARE AS ORDERED.

## 2022-01-19 NOTE — NUR
SUMMARY
PATIENT SLEEPING OFF AND ON T/O NIGHT. CONTINUES TO MOVE RIGHT ARM MORE THAN
LEFT. ONCE TONIGHT TEARFUL WITH YELLOW TEARS. TRACH WITH THICK TAN SPUTUM, AT
TIMES HAVING THICK TAN SPUTUM WITH DEEP ORAL SUCTION. VENT REMAINING ON SPONT
5/5 FIO2 30%. DOBHOFF REMAINS IN PLACE WITH VITAL HP UP TO 45 CC/HR (GOAL RATE
OF 65 CC/HR). RECTAL TUBE DRAINING SMALL AMT OF DARK BROWN LIQUID STOOL. KRAFT
DRAINING VERY SMALL AMT OF YELLOW/ORANGE URINE. PERMACATH REMAINS IN PLACE TO
RIGHT CHEST WALL. CBG'S LOW T/O NIGHT. HYPOTENSION CONTINUES LEVOPHED TITRATED
UP TO 7 MCG DURING THE NIGHT.

## 2022-01-19 NOTE — NUR
ASSUMED CARE OF JAIME, HE IS LYING IN BED ON TRACH COLLAR @ 40% FIO2,
BREATHING 24-36 BREATHS PER MINUTE. RESPONDS TO VOICE AND FOLLOWS COMMANDS, HE
MOVES HIS RIGHT ARM, LEFT LEG AND RIGHT LEG, HE DOESN'T MOVE HIS LEFT ARM,
TRIES TO MOUTH WORDS WHEN ASKED ABOUT IT. HE NODS WHEN ASKED ABOUT PEOPLE AND
REMEMBERING THEM. HE COUGHS WELL WITH THE TRACH COLLAR ON, SUCTION OF GREEN/
WHITE RETURN. HE IS REPOSITIONED HEAVILY TO THE RIGHT AS TO BE OFF HIS
BUTTOCKS. WOUND VAC DRAINING SOME BROWN RETURN, KRAFT WITH DEANA RETURN WITH
SEDIMENT OF BROWN CHUNKS IN LINE, RECTAL TUBE WITH GOOD RETURN OF BROWN
LIQUID. SKIN IS YELLOW, BROWN, DAMP AND DIAPHORETIC, HE DENIES NEED FOR PAIN
MEDICINE. TEMP PER PROBE 101.1 PT BECOMING RESTLESS WILL MEDICATE FOR PAIN AS
INDICATED FROM HIS VITALS, RESTLESSNESS. NOREPINEPHRINE @ 4MCG/KG AT START,
DECREASED TO 3 BUT DROPPED HIS MAP, RETURN TO 4.

## 2022-01-20 LAB
ALBUMIN SERPL BCP-MCNC: 1.6 G/DL (ref 3.4–5)
ANION GAP SERPL CALCULATED.4IONS-SCNC: 13 MMOL/L (ref 6–16)
BASOPHILS # BLD: 0.07 K/MM3 (ref 0–0.23)
BASOPHILS NFR BLD: 1 % (ref 0–2)
BUN SERPL-MCNC: 90 MG/DL (ref 8–24)
CALCIUM SERPL-MCNC: 8.2 MG/DL (ref 8.5–10.1)
CHLORIDE SERPL-SCNC: 99 MMOL/L (ref 98–108)
CO2 SERPL-SCNC: 26 MMOL/L (ref 21–32)
CREAT SERPL-MCNC: 6.69 MG/DL (ref 0.6–1.2)
DEPRECATED RDW RBC AUTO: 58.2 FL (ref 35.1–46.3)
EOSINOPHIL # BLD: 0.35 K/MM3 (ref 0–0.68)
EOSINOPHIL NFR BLD: 5 % (ref 0–6)
ERYTHROCYTE [DISTWIDTH] IN BLOOD BY AUTOMATED COUNT: 24.5 % (ref 11.7–14.2)
GLUCOSE SERPL-MCNC: 105 MG/DL (ref 70–99)
HCT VFR BLD AUTO: 28.6 % (ref 37–53)
HGB BLD-MCNC: 9.8 G/DL (ref 13.5–17.5)
LYMPHOCYTES # BLD: 0.14 K/MM3 (ref 0.84–5.2)
LYMPHOCYTES NFR BLD: 2 % (ref 21–46)
MAGNESIUM SERPL-MCNC: 2.4 MG/DL (ref 1.6–2.4)
MCHC RBC AUTO-ENTMCNC: 34.3 G/DL (ref 31.5–36.5)
MCV RBC AUTO: 81 FL (ref 80–100)
METAMYELOCYTES # BLD MANUAL: 0.07 K/MM3 (ref 0–0)
METAMYELOCYTES NFR BLD MANUAL: 1 % (ref 0–0)
MONOCYTES # BLD: 0.85 K/MM3 (ref 0.16–1.47)
MONOCYTES NFR BLD: 12 % (ref 4–13)
NEUTS BAND NFR BLD MANUAL: 4 % (ref 0–8)
NEUTS SEG # BLD MANUAL: 5.63 K/MM3 (ref 1.96–9.15)
NEUTS SEG NFR BLD MANUAL: 75 % (ref 41–73)
NRBC # BLD AUTO: 0 K/MM3 (ref 0–0.02)
NRBC BLD AUTO-RTO: 0 /100 WBC (ref 0–0.2)
PHOSPHATE SERPL-MCNC: 5.3 MG/DL (ref 2.5–4.9)
PLATELET # BLD AUTO: 147 K/MM3 (ref 150–400)
POTASSIUM SERPL-SCNC: 3.6 MMOL/L (ref 3.5–5.5)
SODIUM SERPL-SCNC: 138 MMOL/L (ref 136–145)
TOTAL CELLS COUNTED BLD: 100
VANCOMYCIN SERPL-MCNC: 20.3 UG/ML

## 2022-01-20 NOTE — NUR
PT. BEGAN VOMITING AFTER ROLL CHANGE. ZOFRAN GIVEN PER MAR INSTRUCTIONS. HOLD
TUBE FEED UNTIL AM PER DR. VILLARREAL.

## 2022-01-20 NOTE — NUR
PT RECEIVED FROM REESE MALIK. PT LYING IN BED, NO RESPONDING TO VERBAL STIMULI
BUT FLINCHES WHEN I TRY TO OPEN HIS EYES. HE IS ON THE VENTILATOR, AC 16 vT
500, PEEP 5, FIO2 30% VIA 8.0 TRACH, WHICH IS CLEAN/DRY/INTACT. RIGHT CHEST
WITH HEMODIALYSIS CATH IN PLACE, JM WITH PICC LINE: INFUSING LEVOPHED @ 8MCG
TURNING DOWN TO 7 RIGHT NOW. DOBBHOFF IN NARES, CLAMPED. SKIN DARK YELLOW IN
COLOR, MOVES ALL EXTREMITIES AT THIS TIME. KRAFT TO GRAVITY DRAINAGE, SCROTUM
AND PENIS ARE SWOLLEN AND SEEPING. RECTAL TUBE IN PLACE, WOUND VAC FUNCTIONING
TO SUCTION. PT WITH MINIMAL SECRETIONS VIA TRACH, YELLOW RETURN. FEET AND
HANDS ARE EDEMATOUS, PULSES DIFFICULT TO FEEL ON FEET.

## 2022-01-20 NOTE — NUR
PT RR 25-35, 'S, RETRACTIONS NOTED ON COLLAR BONE. PT RETURED TO
PRESSURE SUPPORT ON VENTILATOR BY RT 7/5 30% FiO2 AROUND 0800.
FOLLOWING, HR NOTED TO BE
102, RR AT 17 AND NOW RESTING COMFORTABLY.

## 2022-01-20 NOTE — NUR
TOOK OVER CARE OF PT FROM ANGELICA LEIGH AT 0700, PT RESTING ON TRACH COLLAR 35%
FiO2, 3 OF NOREPINEPHRINE. VITALS STABLE AT THIS TIME.

## 2022-01-20 NOTE — NUR
DR. VILLARREAL AT BEDSIDE. DISCUSSED STARTTING MIDODRINE AT 10MG, CHANGING MAP GOAL
TO 60 WHILE ON PRESSERS. DISCUSSED TELEMETRY RYTHM AS WELL.

## 2022-01-20 NOTE — NUR
Patient did not work with physical therapy or occupational therapy today due
to requiring increased pain/anxiety meds, and increasing ventilator support.
Pt not able to participate.

## 2022-01-20 NOTE — NUR
PT ON 8 OF LEVO, VENT SET ON AC/VC 16/500/5/30% FiO2. TUBE FEEDING STOPPED D/T
PT VOMITING. RESTERT IN AM PER DR. VILLARREAL. FIRST DOSE OF MIDODRINE AND DIGOXIN
GIVEN TODAY. WOUND VAC DRESSING WAS ALSO CHANGED. 300 OF FMS OUTPUT. 50 OF
URINE.

## 2022-01-20 NOTE — NUR
JAIME HAS DONE WELL THIS SHIFT, HIS FIO2 IS @ 35%, HE HAS BEEN COUGHING UP
LOOSE SPUTUM, HE IS TOLERATING SUCTIONING AND NOT DROPPING HIS SATS. HE IS
STILL LABILE ON HIS BLOOD PRESSURE TRIED TO TITRATE FROM 4 TO 3 AND IT CAUSES
HIS MAP TO DROP. HE HAS BEEN COMMUNICATING AND ENGAGING WITH STAFF, HE
CONTINUES TO MOVE ALL BUT THE LEFT ARM, ALL EXTREMETIES ARE EDEMATOUS, SKIN
CONTINUES TO BE JAUNDICED, WOUND VAC TO SUCTION, RECTAL TUBE TO GRAVITY, KRAFT
TO GRAVITY WITH DARK DEANA COLORED RETURN. LEVOPHED @ 4MCG/MIN, TUBE FEEDING @
65ML/HR, TEMP .3 NOW 98.8. NO OTHER CHANGES THIS SHIFT.

## 2022-01-21 LAB
ALBUMIN SERPL BCP-MCNC: 1.5 G/DL (ref 3.4–5)
ALBUMIN/GLOB SERPL: 0.4 {RATIO} (ref 0.8–1.8)
ALT SERPL W P-5'-P-CCNC: 78 U/L (ref 12–78)
ANION GAP SERPL CALCULATED.4IONS-SCNC: 15 MMOL/L (ref 6–16)
AST SERPL W P-5'-P-CCNC: 121 U/L (ref 12–37)
BILIRUB DIRECT SERPL-MCNC: 18.8 MG/DL (ref 0–0.3)
BILIRUB INDIRECT SERPL-MCNC: 3.2 MG/DL (ref 0.1–0.7)
BILIRUB SERPL-MCNC: 22 MG/DL (ref 0.1–1)
BUN SERPL-MCNC: 108 MG/DL (ref 8–24)
CALCIUM SERPL-MCNC: 8.6 MG/DL (ref 8.5–10.1)
CHLORIDE SERPL-SCNC: 98 MMOL/L (ref 98–108)
CO2 SERPL-SCNC: 24 MMOL/L (ref 21–32)
CREAT SERPL-MCNC: 7.8 MG/DL (ref 0.6–1.2)
GLOBULIN SER CALC-MCNC: 3.6 G/DL (ref 2.2–4)
GLUCOSE SERPL-MCNC: 67 MG/DL (ref 70–99)
HCT VFR BLD AUTO: 30.6 % (ref 37–53)
HGB BLD-MCNC: 10.4 G/DL (ref 13.5–17.5)
MAGNESIUM SERPL-MCNC: 2.7 MG/DL (ref 1.6–2.4)
PHOSPHATE SERPL-MCNC: 6.2 MG/DL (ref 2.5–4.9)
POTASSIUM SERPL-SCNC: 3.9 MMOL/L (ref 3.5–5.5)
PROT SERPL-MCNC: 5.1 G/DL (ref 6.4–8.2)
SODIUM SERPL-SCNC: 137 MMOL/L (ref 136–145)

## 2022-01-21 NOTE — NUR
JAIME HAS BEEN ON SPONTANEOUS SINCE AROUND MIDNIGHT, RATE 10 PEEP 5. HE HAS
DONE WELL, HE HAS BEEN A LITTLE MORE RESTLESS, DIDN'T WAKE UP AND OPEN EYES OR
FOLLOW COMMANDS UNTIL AROUND 0400. HE IS ANSWERING WITH HEAD NODS. HE HAD
MINIMAL URINE OUTPUT, RECTAL TUBE WITH 500ML OUT, WOUND VAC INTACT. LEVOPHED @
7MCG, ATTEMPTS TO DECREASE T/O NIGHT. TUBE FEEDING HAS REMAINED OFF. NO OTHER
CHANGES.

## 2022-01-21 NOTE — NUR
ASSUMED CARE AT 1900
PT LAYING IN BED ON THE VENT VIA TRACH WITH VENT SETTINGS SPONT 10/5, FIO2
30%. PT REACTIVE TO VERBAL STIMULI; ATTMEPTS TO FOLLOW DIRECTIONS BUT IS
PROFOUNDLY WEAK; WILL OPEN EYES AND MOVE MOUTH DURING ORAL CARE; INVOLUNTARY
TWITCHING NOTED. 'S. SBP 90'S. MAP >65; LEVOPHED ON SB; SCHEDULED
MIDODRINE GIVEN. PIVOT INFUSING VIA DOBHOFF AT 30ML/HR WITH 30ML WATER FLUSHES
Q4HR. RECTAL TUBE IN PLACE. KRAFT DRAINING LITTLE AMOUNT OF URINE. SEE SHIFT
ASSESSMENT FOR FULL ASSESSMENT.

## 2022-01-21 NOTE — NUR
SHIFT SUMMARY
 
PT REMAINS ON VENTILATOR, SETTINGS REMAIN SPONT 10/5, 30%. TRACH CHANGED THIS
MORNING BY DR. ROCHA. DIALYSIS REMOVED 5L. LEVOPHED PLACED ON STANDBY AT 1700,
MAP REMAINS>60. PT RESPONSIVE TO VERBAL STIMULATION, FOLLOWS COMMANDS VERY
WEAKLY. TUBE FEED CHANGED TO PIVOT 1.5, CURRENTLY RUNNING @20 MLS/HR WITH GOAL
OF 45 ML/HR. PT HYPOGLYCEMIC SEVERAL TIMES T/O SHIFT, TOTAL OF 3 AMPS D50
GIVEN OVER THE 12 HOURS. 50 ML WATERY STOOL FROM RECTAL TUBE. 75 ML DARK DEANA
URINE FROM KRAFT. WILL REPORT TO ONCOMING NURSE.

## 2022-01-21 NOTE — NUR
PT STARTING TO ENGAGE MORE, STILL VERY SLEEPY. NOT FOLLOWING COMMANDS,
RESTLESS WITH POSITION CHANGES. UNABLE TO GET EYES TO OPEN OR FOCUS ON STAFF.

## 2022-01-21 NOTE — NUR
ASSUMED CARE OF PT, REPORT RCV'D FROM REESE DOMINGUEZ.
 
PT TRACHED, VENT SETTINGS SPONTANEOUS 10/5, 30%. PT WITHDRAWS FROM PAINFUL
STIMULI, INTERMITTENTLY WEAKLY FOLLOWS COMMANDS TO SQUEEZE HANDS BILATERALLY.
WEAK GROSS MOVEMENT, RIGHT>LEFT. INVOLUNTARY TWITCHING T/O BODY.
LEVOPHED DECREASED TO 6 MCG/MIN TO MAINTAIN MAP>60. PT HYPOGLYCEMIC THIS AM
WITH BG 58, PT GIVEN 1 AMP D50, BG @84. RESTARTED TF @ 10 ML/HR PER DR. VILLARREAL,
WILL INCREASE BY 10 MLS Q4H UNTIL GOAL REACHED. WOUND VAC PATENT.
REPOSITIONING Q2 AND PRN TO MAINTAIN SKIN INTEGRITY. KRAFT AND RECTAL TUBE
PATENT AND DRAINING TO GRAVITY. SEE FULL SHIFT ASSESSMENT.

## 2022-01-21 NOTE — NUR
JAIME IS JUST NOW STARTING TO RESPOND TO VERBAL STIMULI AND COMMANDS. HE CONT
TO BE VERY SLEEPY, HE IS A LITTLE RESTLESS, JUST WITH HIS ARMS, WRESTLE AROUND
WITH HIS POSITION. HE SEEMS TO BE UNSURE IF HE IS COMFORTABLE. HE HAS COUGHED
A COUPLE OF TIMES AND THE CIRCUIT HAS POPPED OFF. HE DOESN'T DROP HIS
SATURATIONS.

## 2022-01-22 LAB
ALBUMIN SERPL BCP-MCNC: 1.8 G/DL (ref 3.4–5)
ANION GAP SERPL CALCULATED.4IONS-SCNC: 14 MMOL/L (ref 6–16)
BASOPHILS # BLD AUTO: 0.06 K/MM3 (ref 0–0.23)
BASOPHILS NFR BLD AUTO: 1 % (ref 0–2)
BUN SERPL-MCNC: 81 MG/DL (ref 8–24)
CALCIUM SERPL-MCNC: 9 MG/DL (ref 8.5–10.1)
CHLORIDE SERPL-SCNC: 100 MMOL/L (ref 98–108)
CO2 SERPL-SCNC: 24 MMOL/L (ref 21–32)
CREAT SERPL-MCNC: 6.58 MG/DL (ref 0.6–1.2)
DEPRECATED RDW RBC AUTO: 74.3 FL (ref 35.1–46.3)
EOSINOPHIL # BLD AUTO: 0.89 K/MM3 (ref 0–0.68)
EOSINOPHIL NFR BLD AUTO: 13 % (ref 0–6)
ERYTHROCYTE [DISTWIDTH] IN BLOOD BY AUTOMATED COUNT: 26.1 % (ref 11.7–14.2)
GLUCOSE SERPL-MCNC: 91 MG/DL (ref 70–99)
HCT VFR BLD AUTO: 28.8 % (ref 37–53)
HGB BLD-MCNC: 9.6 G/DL (ref 13.5–17.5)
IMM GRANULOCYTES # BLD AUTO: 0.34 K/MM3 (ref 0–0.1)
IMM GRANULOCYTES NFR BLD AUTO: 5 % (ref 0–1)
LYMPHOCYTES # BLD AUTO: 0.7 K/MM3 (ref 0.84–5.2)
LYMPHOCYTES NFR BLD AUTO: 10 % (ref 21–46)
MAGNESIUM SERPL-MCNC: 2.5 MG/DL (ref 1.6–2.4)
MCHC RBC AUTO-ENTMCNC: 33.3 G/DL (ref 31.5–36.5)
MCV RBC AUTO: 83 FL (ref 80–100)
MONOCYTES # BLD AUTO: 0.71 K/MM3 (ref 0.16–1.47)
MONOCYTES NFR BLD AUTO: 10 % (ref 4–13)
NEUTROPHILS # BLD AUTO: 4.11 K/MM3 (ref 1.96–9.15)
NEUTROPHILS NFR BLD AUTO: 60 % (ref 41–73)
NRBC # BLD AUTO: 0 K/MM3 (ref 0–0.02)
NRBC BLD AUTO-RTO: 0 /100 WBC (ref 0–0.2)
PHOSPHATE SERPL-MCNC: 5.1 MG/DL (ref 2.5–4.9)
PLATELET # BLD AUTO: 164 K/MM3 (ref 150–400)
POTASSIUM SERPL-SCNC: 3.6 MMOL/L (ref 3.5–5.5)
SODIUM SERPL-SCNC: 138 MMOL/L (ref 136–145)

## 2022-01-22 NOTE — NUR
ASSUMED CARE AT 1900
PT LAYING IN BED ON THE VENT VIA TRACH WITH VENT SETTINGS SPONT 10/5, FIO2
30%, ; MODERATE AMOUNT OF THICK SECREATIONS FROM ETT. PT LESS RESPONSIVE
THAN PREVIOUS DAY BUT DOES OPEN EYES TO VERBAL STIMULATION; VERY WEAK;
OCCATIONALLY AND MINIMALLY WILL RESPOND TO Y/N QUESTIONS WITH HEAD NODS. HR
100-110. SBP 90'S; MAP >60. PIVOT INFUSING VIA DOBHOFF AT 75ML/HR (GOAL) WITH
30ML WATER FLUSHES Q4HR. RECTAL TUBE AND KRAFT IN PLACE AND DRAINING TO
GRAVITY. WOUND VAC IN PLACE. SEE SHIFT ASSESSMENT FOR FULL ASSESSMENT.

## 2022-01-22 NOTE — NUR
ASSUMED CARE OF, REPORT RCV'D FROM SANDY SONI
 
PT TRACHED, ON VENTILATOR SPONT 10/5, 30% PT HAS COARSE LUNG SOUNDS WITH
MODERATE AMOUNT OF THICK YELLOW SPUTUM. PT OPENS EYES TO VERBAL STIMULATION,
WEAKLY FOLLOWS COMMANDS. VITAL HIGH PROTEIN AT GOAL RATE THROUGH DOBHOFF. TEMP
KRAFT DRAINING SCANT AMOUNT URINE. RECTAL TUBE PATENT AND DRAINING LIQUID
BROWN STOOL. PT JAUDICED, 3+ PITTING EDEMA BILATERAL FEET.  WOUND DRESSING/VAC
CHANGED. DURING DRESSING CHANGE IT WAS NOTICED THAT PT'S PRESSURE ULCER
WORSENING, FOUL ODOR EMINATING FROM WOUND SITE. NEW PICTURES TAKEN (SEE
CHART), WOUND CLEANSED WITH SALINE AND NEW DRESSING/WOUND VAC PLACED. DR. VILLARREAL
AWARE OF WOUND CONDITION-WILL RECONSULT DR. DICKERSON FOR POSSIBLE DEBRIDEMENT.
VSS AT THIS TIME. SEE FULL SHIFT ASSESSMENT.

## 2022-01-22 NOTE — NUR
END OF SHIFT SUMMARY
NO ACUTE EVENTS OVERNIGHT. PT CONT TO BE RESPONSIVE TO VERBAL STIMULI; WAS
MORE SOMNULENT EARLY THIS AM. ATIVAN GIVEN ONCE FOR RESTLESSNESS AND HELPFUL.
CONT TO BE ON VENT VIA TRACH WITH SETTINGS SPONT 10/5, FIO2 30%. MAX TEMP
100.0; FANS IN PLACE AND HELPING. -120. SBP ; MAP >60; LEVOPHED
OFF ALL SHIFT. PIVOT INFUSING VIA DOBHOFF AT GOAL OF 45ML/HR; PT TOLERATING.
RECTAL TUBE AND KRAFT IN PLACE WITH SMALL AMOUNTS OF OUTPUT. CBG >70 EACH
CHECK. WILL REPORT TO AM RN WHEN AVAILABLE.

## 2022-01-23 LAB
ALBUMIN SERPL BCP-MCNC: 1.6 G/DL (ref 3.4–5)
ANION GAP SERPL CALCULATED.4IONS-SCNC: 15 MMOL/L (ref 6–16)
BUN SERPL-MCNC: 102 MG/DL (ref 8–24)
CALCIUM SERPL-MCNC: 9.5 MG/DL (ref 8.5–10.1)
CHLORIDE SERPL-SCNC: 100 MMOL/L (ref 98–108)
CO2 SERPL-SCNC: 22 MMOL/L (ref 21–32)
CREAT SERPL-MCNC: 7.49 MG/DL (ref 0.6–1.2)
DEPRECATED RDW RBC AUTO: 77 FL (ref 35.1–46.3)
ERYTHROCYTE [DISTWIDTH] IN BLOOD BY AUTOMATED COUNT: 26.7 % (ref 11.7–14.2)
GLUCOSE SERPL-MCNC: 130 MG/DL (ref 70–99)
HCT VFR BLD AUTO: 30.7 % (ref 37–53)
HGB BLD-MCNC: 10.1 G/DL (ref 13.5–17.5)
MCHC RBC AUTO-ENTMCNC: 32.9 G/DL (ref 31.5–36.5)
MCV RBC AUTO: 84 FL (ref 80–100)
NRBC # BLD AUTO: 0.02 K/MM3 (ref 0–0.02)
NRBC BLD AUTO-RTO: 0.3 /100 WBC (ref 0–0.2)
PHOSPHATE SERPL-MCNC: 5.5 MG/DL (ref 2.5–4.9)
PLATELET # BLD AUTO: 198 K/MM3 (ref 150–400)
POTASSIUM SERPL-SCNC: 3.9 MMOL/L (ref 3.5–5.5)
SODIUM SERPL-SCNC: 137 MMOL/L (ref 136–145)

## 2022-01-23 NOTE — NUR
ASSUMED CARE AT 1900/ DOBHOFF REPLACEMENT
PT LAYING IN BED ON VENT VIA TRACH WITH VENT SETTINGS SPONT 10/5, FIO2 30%;
MODERATE TO LARGE AMOUNT OF THICK SECREATIONS FROM ETT. PT NOT AS RESPONSIVE
AS PREVIOUS DAY; REACTIVE TO PAINFUL AND NOXIOUS STIMULI; INVOLUNTARY
TWITCHING NOTED; PROFOUNDLY WEAK; IS NOT ABLE TO KEEP HIMSELF FROM LEANING
INTO EITHER RALE WHEN REPOSITIONED. TEMP 101.3; FAN AND ICE PACKS PLACE,
TYLENOL GIVEN. -120. -130; LEVOPHED INFUSING AT 8MCG/MIN. SEE
BELOW REGARDING DOBHOFF. RECTAL TUBE AND KRAFT IN PLACE AND DRAINING TO
GRAVITY. WOUND VAC IN PLACE TO COCCYX. SEE SHIFT ASSESSMENT FOR FULL
ASSESSMENT.
 
AT 2030 PT FOUND TO BE COUGHING AND OFF OF THE VENT. DOBHOFF WAS OUT OF PT
MOUTH WITH LARGE AMOUNT OF FORMULA ON THE FLOOR. TF STOPPED AND DOBHOFF
REMOVED. DR VILLARREAL NOTIFED AND STATED TO ATTEMPT TO REPLACE DOBHOFF AND IF
UNSUCCESSFUL TO WAIT TO REPLACE. NEW DOBHOFF PLACED AT 2105; CHEST XRAY
COMPLETED AND READ BY DR KAISER. HE REPORTED TO ADVANCE DOBHOFF BY AT LEAST
2CM; DOBHOFF ADVANCED FROM 65CM TO 70CM. PM MEDS GIVEN AND PIVOT RESTARTED AT
40ML/HR. PLAN TO INCREASE TO 75ML/HR (GOAL) AT 0400 IF PT TOLERATES.

## 2022-01-23 NOTE — NUR
PATIENT RESPONDS TO PAIN, NOT NODING HEAD YES OR NO TODAY, TWITCHING MOVEMENTS
IN HANDS AND TOPNGUE, TEMP 100.7. LS DIMISHED, ORAL AND INLINE SUCTION CLEARS
CONGESTION, SB 10/5 30%, INNER CANULA CHANGED TODAY, TRACH CARE DONE. HEART
RATE A FLUTTER, MEDICATED WITH SCHEDULED DIGOXIN, MIDODRINE, 2-3+ PITTING
EDEMA, DIALYSIS ONLY TOOK OFF 4.5 L DUE TO BP. LEVOPHED INFUSING AT 8 MCG,
[id number]. DOBHOFF TUBE TO PIVOT AT 75ML/HR, RECTAL TUBE, TEMPFOLEY DRAINING
TO GRAVITY, DARK DEANA URINE. PRESSURE ULCER ON SACRUM, WOUND VAC CHANGED DUE
TO LOOSE STOOLS, SUCTION INTACT NOW, RECTAL TUBE IRRIGATED, NEW DRESSING TO
SACRUM. POSSIBLE SECOND DEBRIDMENT. WILL RELAY TO PM RN, MARK

## 2022-01-23 NOTE — NUR
1240 WOUND VAC LEAKING, NEW DRESSING APPLIED, 120 MM PRESSURE NO LEAKS NOW,
DIALYSIS DONE, PATIENT REPOSITIONED, RECTAL TUBE IN PLACE, 4.5L OFF,
EXTREMITIES ELEVATED

## 2022-01-23 NOTE — NUR
DR FRAGA ROUNDED, REPORTED DIALYSIS TO BE DONE TODAY, K+ 3.9, PHOS 5.5, NO
CHANGES ORDERED PER DR FRAGA
DIETICIAN ROUNDED, NO CHANGES TO TUBE FEED
DR VILLARREAL ROUNDED, REPORTED DIALYSIS TO BE DONE THIS AM, HOLDING AM MEDS FOR
DIALYSIS, NO NEW ORDERS

## 2022-01-23 NOTE — NUR
patient resting, no distress, sb via trach, hand movement shaky, very weak,
vss, levophed infusing, pivot via dobhoff at 75 ml/hr, wctm

## 2022-01-23 NOTE — NUR
END OF SHIFT SUMMARY
PT CONT TO BE ON VENT VIA TRACH WITH VENT SETTINGS 10/5, FIO2 30%; INCREASE IN
ORAL AND ETT SECREATIONS. CONT TO BE MINIMALLY RESPONSIVE; ATIVAN GIVEN ONCE
FOR RESTLESSNESS; WILL OPEN EYES OCCATIONALLY. MAX TEMP 99.8. -120.
LEVOPHED STARTED AT 0030 DUE TO MAP <60, LEVOPHED INFUSING AT 2MCG/MIN, SBP
 SINCE LEVOPHED STARTED; -120. PIVOT INFUSING VIA DOBHOFF AT GOAL.
RECTAL TUBE IN PLACE WITH 400ML OUTPUT. KRAFT IN PLACE WITH MINIMAL OUTPUT;
55ML OUT. SCAB ON RT EAR ACCIDENTALLY REMOVED DURING REPOSITIONSING; WOUND
CLEAND, ANTIBIOTIC OINTMENT APPLIED. WILL REPORT TO AM RN WHEN AVAILABLE.

## 2022-01-23 NOTE — NUR
DIALYSIS-PD
PT'S WOUND VAC WAS LEAKING, ICU STAFF TURNED HIM TO THE SIDE SO THEY COULD
CLEAN, REDRESS, AND RESTART THE WOUND VAC. THE POSITION WAS NOT GOOD FOR THE
DIALYSIS TX. KEPT ALARMING FOR HIGH VENOUS PRESSURE. WAS AFRAID OF CLOTTING SO
DC'ED TX. 2.5 HOURS TX, 4.5 LITERS UF.

## 2022-01-24 LAB
ALBUMIN SERPL BCP-MCNC: 2.1 G/DL (ref 3.4–5)
ALBUMIN/GLOB SERPL: 0.6 {RATIO} (ref 0.8–1.8)
ALT SERPL W P-5'-P-CCNC: 59 U/L (ref 12–78)
ANION GAP SERPL CALCULATED.4IONS-SCNC: 15 MMOL/L (ref 6–16)
AST SERPL W P-5'-P-CCNC: 106 U/L (ref 12–37)
BILIRUB DIRECT SERPL-MCNC: 23.7 MG/DL (ref 0–0.3)
BILIRUB INDIRECT SERPL-MCNC: 3.9 MG/DL (ref 0.1–0.7)
BILIRUB SERPL-MCNC: 27.6 MG/DL (ref 0.1–1)
BUN SERPL-MCNC: 94 MG/DL (ref 8–24)
CALCIUM SERPL-MCNC: 10.1 MG/DL (ref 8.5–10.1)
CHLORIDE SERPL-SCNC: 97 MMOL/L (ref 98–108)
CO2 SERPL-SCNC: 25 MMOL/L (ref 21–32)
CREAT SERPL-MCNC: 6.79 MG/DL (ref 0.6–1.2)
DEPRECATED RDW RBC AUTO: 77.7 FL (ref 35.1–46.3)
ERYTHROCYTE [DISTWIDTH] IN BLOOD BY AUTOMATED COUNT: 27.2 % (ref 11.7–14.2)
GLOBULIN SER CALC-MCNC: 3.4 G/DL (ref 2.2–4)
GLUCOSE SERPL-MCNC: 118 MG/DL (ref 70–99)
HCT VFR BLD AUTO: 30.2 % (ref 37–53)
HGB BLD-MCNC: 9.9 G/DL (ref 13.5–17.5)
MCHC RBC AUTO-ENTMCNC: 32.8 G/DL (ref 31.5–36.5)
MCV RBC AUTO: 84 FL (ref 80–100)
NRBC # BLD AUTO: 0.02 K/MM3 (ref 0–0.02)
NRBC BLD AUTO-RTO: 0.2 /100 WBC (ref 0–0.2)
PHOSPHATE SERPL-MCNC: 4.3 MG/DL (ref 2.5–4.9)
PLATELET # BLD AUTO: 224 K/MM3 (ref 150–400)
POTASSIUM SERPL-SCNC: 3.6 MMOL/L (ref 3.5–5.5)
PROT SERPL-MCNC: 5.5 G/DL (ref 6.4–8.2)
SODIUM SERPL-SCNC: 137 MMOL/L (ref 136–145)

## 2022-01-24 PROCEDURE — 0JB70ZZ EXCISION OF BACK SUBCUTANEOUS TISSUE AND FASCIA, OPEN APPROACH: ICD-10-PCS | Performed by: INTERNAL MEDICINE

## 2022-01-24 NOTE — NUR
UPDATE: JAYLA UPDATED.
 
DR WHITE ROUNDPARMINDER, DISCUSSED CONCERNS FOR PT's DEC LOC TODAY AS COMPARED TO
THE LAST FEW DAYS. PT REMAINS SOMNOLENT, UNABLE TO FOLLOW COMMANDS, DOES NOT
OPEN EYES TO NAME. VERBAL ORDERS GIVEN TO HOLD ALL SEDATING MEDICATIONS.
SACRAL WOUND DRESSING SOILED, CLEAR DRESSING REPLACED, & WOUND NOTED TO BE
MALORDOROUS. PER CHRISTOPHER REQUEST, JAYLA UPDATED & HE WILL COME ASSESS THE
WOUND & POSSIBLY DO ANOTHER SURGICAL DEBRIEDMENT.

## 2022-01-24 NOTE — NUR
UPDATE: HAILEY FLORES.
 
PROVIDER @ BEDSIDE FOR INITIAL PT ASSESSMENT. PT PLACED IN SIDE LAYING
POSITION & WOUND VAC REMOVED. HAILEY WOULD LIKE TO TAKE THE PT TO THE OR TODAY
& DO A 2nd DEBRIEDMENT.
LOC UNCHANGED T/O THE SHIFT. PT DOES NOT OPEN EYES OR FOLLOW COMMANDS. WINCES
TO NOX STIM. STRONG COUGH, +GAG, +SWALLOW. COPIOUS AMNT OF ETT SECRETIONS,
YELLOW/THICK. PT CONTINUES ON TRACH COLLAR @ 8L/MIN W/ SPO2 >92%. WILL CALL RT
TO PLACE PT BACK ON VENT ONCE READY TO GO TO THE OR.

## 2022-01-24 NOTE — NUR
ASSUMED CARE @1900
PATIENT NOT OPENING EYES, RESPONDS TO PAINFUL STIMULI, MOVES UPPER EXTREMETIES
AND WIGGLES TOES SPONTANEUOUSLY. PATIENT UNABLE TO FOLLOW COMMANDS. 02 SATS
98% ON VENT VIA TRACH SPONT PS 10/5 30% FI02. TAN SPUTUM SUCTIONED AND SENT TO
LAB. LUNGS SOUND CLEAR TO DIMINISHED. HR ST AT TIME OF ASSESSMENT APPEARS
A.FLUTTER @106. BP STABLE WITH LEVOPHED INF 5 MCG/MIN. TUBE FEED INF VIA
DOBHOFF TWOCAL HN @20 MLS/HR WOTH 30 ML FLUSHES Q4 HOURS. WILL INCREASE RATE
PER ORDERS. RECTAL TUBE DRAINING BROWN LIQUID STOOL. KRAFT DRAINING SMLL
AMOUNT OF YELLOW/ORANGE URINE. ORAL CARE DONE AND PATIENT REPOSITIONED.
MEPILEX TO RIGHT HIP FOR PREVENTION.

## 2022-01-24 NOTE — NUR
END OF SHIFT SUMMARY
PT CONT TO BE ON VENT VIA TRACH WITH VENT SETTINGS 10/5, FIO2 30%, MODERATE TO
LARGE AMOUNT OF ETT AND ORAL SECREATIONS. PT MORE RESPONSIVE THIS AM THAN
PREVIOUSLY TO VERBAL STIMULI AND MADE EYE CONTACT. MAX TEMP 101.3, TEMP NOW
99.6. -120. -130; LEVOPHED TITRATED DOWN TO 6MCG/MIN. PIVOT
INCREASED TO 75ML/HR (GOAL) AT 0500; PT TOLERATING WELL. RECTAL TUBE IN PLACE
WITH 300ML OUTPUT. KRAFT IN PLACE WITH 30ML DARK DEANA OUTPUT. WOUND ON EAR
CLEANED AND NEW PICTURES IN CHART; WOUND IS NOW A HOLE WITH YELLOW DRAINAGE,
SLIGHT ODER; GAUZE AND PAPER TAPE APPLIED AFTER CLEANING. WILL REPORT TO AM RN
WHEN AVAILABE.

## 2022-01-24 NOTE — NUR
UPDATE: PT TO OR.
 
OR CREW @ BEDSIDE. DAUGHTER CALLED & UPDATED, CONSENT RECEIVED. PT TO OR FOR
DEBRIEDMENT

## 2022-01-24 NOTE — NUR
SHIFT SUMMARY:
 
PT RETURNS FROM OR @ 1745. SACRAL WOUND WAS DEBRIEDED & DRESSED W/ WET TO DRY
DRESSING. PT CONTINUES TO BE SOMNOLENT, UNRESPONSIVE TO VERBAL STIMULI. TRACH
PUT BACK ON THE VENT ON SPONTANEOUS PS 10, 5/30%. TF RESTARTED W/ NEW FORMULA
@ 20ml/hr, PT TOLERATING WELL THUS FAR. MAPs >65 w/ LEVOPHED @ 5mcg/min.
DRESSING TO R EAR CHANGED. 60ml UOP.

## 2022-01-24 NOTE — NUR
ASSUMING PT CARE:
 
PT RESTING W/ EYES CLOSED, OPENS EYES TO NAME & LOUD STIM, UNABLE TO FOLLOW
COMMANDS. CONSTANT DYSKINESIA OF THE BUE & TONGUE. TRACH COLLAR ON
SPONTANEOUS, PS 10, 5/30%. THICK YELLOW SECRETIONS FROM ETT. TF PLACED ON HOLD
D/T PERSISTENT WRETCHING & TWO EPISODES OF VOMITING. PRN MEDS GIVEN. SEE
INITIAL SHIFT DOCUMENTATION FOR FULL ASSESSMENT.

## 2022-01-25 LAB
ALBUMIN SERPL BCP-MCNC: 1.8 G/DL (ref 3.4–5)
ANION GAP SERPL CALCULATED.4IONS-SCNC: 15 MMOL/L (ref 6–16)
BUN SERPL-MCNC: 115 MG/DL (ref 8–24)
CALCIUM SERPL-MCNC: 10.7 MG/DL (ref 8.5–10.1)
CHLORIDE SERPL-SCNC: 97 MMOL/L (ref 98–108)
CO2 SERPL-SCNC: 23 MMOL/L (ref 21–32)
CREAT SERPL-MCNC: 7.93 MG/DL (ref 0.6–1.2)
DEPRECATED RDW RBC AUTO: 80.7 FL (ref 35.1–46.3)
ERYTHROCYTE [DISTWIDTH] IN BLOOD BY AUTOMATED COUNT: 27.9 % (ref 11.7–14.2)
GLUCOSE SERPL-MCNC: 90 MG/DL (ref 70–99)
HCT VFR BLD AUTO: 30.4 % (ref 37–53)
HGB BLD-MCNC: 10.1 G/DL (ref 13.5–17.5)
MAGNESIUM SERPL-MCNC: 2.9 MG/DL (ref 1.6–2.4)
MCHC RBC AUTO-ENTMCNC: 33.2 G/DL (ref 31.5–36.5)
MCV RBC AUTO: 84 FL (ref 80–100)
NRBC # BLD AUTO: 0.03 K/MM3 (ref 0–0.02)
NRBC BLD AUTO-RTO: 0.3 /100 WBC (ref 0–0.2)
PHOSPHATE SERPL-MCNC: 5.7 MG/DL (ref 2.5–4.9)
PLATELET # BLD AUTO: 251 K/MM3 (ref 150–400)
POTASSIUM SERPL-SCNC: 4.2 MMOL/L (ref 3.5–5.5)
SODIUM SERPL-SCNC: 135 MMOL/L (ref 136–145)

## 2022-01-25 NOTE — NUR
SHIFT SUMMARY:
 
PT RESTING IN BED W/ EYES CLOSED, NO POSITIVE PROGRESSION IN MENTATION. PT
BEGINING TO HAVE MORE SUSTAINED EPISODES OF DYSKINESIA & AGITATION, COUGHING
AGAINST ETT, & RESTLESS IN BED. PRN FENTANYL WAS SOMEWHAT HELPFUL. NO TRACH
COLLAR TRIAL TODAY D/T PT AGITATION. VENT SETTINGS UNCHANGED. SACRAL WOUND
DRESSING CHANGED - NO EXUDATE, MOD AMNT OF BLEEDING TO BASIL WOUND. WET-DRY
DRESSING PLACED. DIALYSIS DONE TODAY, 5.6L TAKEN OFF. LEVOPHED UNABLE TO BE
TITRATED OFF, NOW 4mcg/min. TF TO REMAIN OFF FOR NOW. SEE PREVIOUS NOTATION
FOR PT PROGRESSION.

## 2022-01-25 NOTE — NUR
UPDATE: DIALYSIS
 
DIALYSIS @ BEDSIDE. GOAL TO TAKE OFF 5.5L. PT IS TOLERATING WELL THUS FAR.
LEVOPHED TITRATED UP TO 8mcg/min & MAPs MAINTAINING >65. HR REMAINS, low 100s,
SINUS TACH. NO CHANGE IN MENTATION.

## 2022-01-25 NOTE — NUR
DIALYSIS COMPLETE. PT TOLERATED WELL. VS STABLE T/O. MAPs MAINTAINED >65 w/
LEVOPHED @ 8mcg/min. PER DIALYSIS RN, 5.6L TAKEN OFF.
DISCUSSED NUTRITION CONCERNS W/ DIETARY. TPN IS NOT IDEAL CONSIDERING PT's
ORGAN DYSFUNCTION, HOWEVER IF ABLE, DIETARY SUGGESTS ADVANCING THE DOBHOFF
FURTHER PAST THE STOMACH & INTO THE INTESTINE. THIS MAY HELP THE PT TOLERATE
FEEDINGS. WILL DISCUSS W/ GOSMAN ONCE AVAILABLE.

## 2022-01-25 NOTE — NUR
DISCUSSED ADVANCING NADINE ARROYO/ CHRISTOPHER. HE DOES NOT BELIEVE THIS WOULD NOT BE
BENEFICIAL & THE MOST BENEFICIAL OPTION WOULD BE A SURGICAL GI INTERVENTION,
WHICH WILL NOT BE ADDRESSED AT THIS POINT.
CALLS PUT OUT TO BOTH PT's SON & DAUGHTER TO DISCUSS PT's PROGRESSION, NO
RETURN CALL AS OF YET.

## 2022-01-25 NOTE — NUR
ASSUMED CARE AT 1900
PATIENT IS ALERT MOVING SPONTANEOUSLY WITH HIS EYES OPEN. UNABLE TO TRACK WITH
HIS EYES AND UNABLE TO FOLLOW COMMANDS. DYSKENESIA LIKE MOVEMENTS WITH
EXTREMETIES, HEAD, AND TONGUE. 02 SATS 97% ON VENT VIA TRACH SPONT PS 10/5
FI02 30%. LUNGS SOUND CLEAR TO DIM. SUCTIONING WELLS SPUTUM VIA TRACH. HR ST
110s. BP STABLE ON 1 MCG/MIN OF LEVOPHED. TUBE FEED ON SB DUE TO EMESIS LAST
NIGHT/ EARLY AM. RECTAL TUBE DRANING LIQUID BROWN STOOL. KRAFT DRAINING SMALL
AMOUNT OF DEANA/ORANGE URINE. PATIENT MEDICATED FOR PAIN PER EMAR AND
REPOSITIONED. SEE SHIFT ASSESSMENT FOR MORE DETAIL.

## 2022-01-25 NOTE — NUR
ASSUMING PT CARE:
 
PT LAYING IN BED W/ EYES CLOSED. DOES NOT OPEN EYES OR RESPOND TO VERB
STIMULI. WINCES W/ ORAL CARE. NO INTENTIONAL MOVMENT OBSERVED. DYSKINESIA
CONTINUES, SEEMS TO BE INCREASED WHEN PT IS UNCOMFORTABLE. AT REST, PT IS
STILL. TRACH TO VENT, SPONTANEOUS 10/5 30%. THICK YELLOW SECRETIONS FROM ETT.
INTERMITTENT PRODUCTIVE COUGH. ABD SOFT, DISTENDED. TF ON HOLD D/T EMESIS LAST
NOC, WILL ADDRESS W/ MD TODAY. JAUNDICE APPEARS WORSE TODAY THAN YESTERDAY.
SACRAL WOUND DRESSING INTACT W/ WET-DRY DRESSING, SCANT SEROSANGUINOUS
DRAINAGE. PLAN FOR DIALYSIS TODAY & TO ADDRESS NUTRITION W/ MD & DIETARY.

## 2022-01-25 NOTE — NUR
SHIFT SUMMARY
PATIENT STILL NOT OPENING EYES. RESPONDS TO PAINFUL STIMULI. MOVES ALL
EXTREMETIES BUT UNABLE TO FOLLOW COMMANDS. MOVES RIGHT UPPER EXTREMETIY MORE
THAN LEFT. 02 SATS 97% ON VENT VIA TRACH 10/5 FI02 30%. RR 20s-30s. SUCTIONED
MODERATE AMOUNT OF TAN SPUTUM MOST THE NIGHT. PATIENT APPEARED TO BE NAUSEOUS
AND NOT TOLERATING TUBE FEED WHICH WAS PLACED IN STANDBY AND PATIENT MEDICATED
FOR NAUSEA. AT APPROX 0500 PATIENT STARTED VOMITTING TUBE FEED. TUBE FEED
SUCTIONED VIA TRACH AS WELL. TRACH DRESSING AND COLLAR CHANGED. PATIENTS HR ST
105. BP STABLE WITH MAP >65 ON 2MCG/MIN OF LEVOPHED. RECTAL TUBE DRAINING
BROWN LIQUID STOOL. KRAFT DRAINING SMALL AMOUNT OF DEANA URINE. PATIENT
REPOSITIONED Q2 HOURS. MEDICATED PER EMAR FOR PAIN.

## 2022-01-26 LAB
ALBUMIN SERPL BCP-MCNC: 1.7 G/DL (ref 3.4–5)
ALBUMIN/GLOB SERPL: 0.5 {RATIO} (ref 0.8–1.8)
ALT SERPL W P-5'-P-CCNC: 57 U/L (ref 12–78)
ANION GAP SERPL CALCULATED.4IONS-SCNC: 14 MMOL/L (ref 6–16)
AST SERPL W P-5'-P-CCNC: 137 U/L (ref 12–37)
BASOPHILS # BLD: 0 K/MM3 (ref 0–0.23)
BASOPHILS NFR BLD: 0 % (ref 0–2)
BILIRUB SERPL-MCNC: 23.9 MG/DL (ref 0.1–1)
BUN SERPL-MCNC: 99 MG/DL (ref 8–24)
CALCIUM SERPL-MCNC: 11.1 MG/DL (ref 8.5–10.1)
CHLORIDE SERPL-SCNC: 99 MMOL/L (ref 98–108)
CO2 SERPL-SCNC: 24 MMOL/L (ref 21–32)
CREAT SERPL-MCNC: 6.57 MG/DL (ref 0.6–1.2)
DEPRECATED RDW RBC AUTO: 82.6 FL (ref 35.1–46.3)
EOSINOPHIL # BLD: 0.39 K/MM3 (ref 0–0.68)
EOSINOPHIL NFR BLD: 3 % (ref 0–6)
ERYTHROCYTE [DISTWIDTH] IN BLOOD BY AUTOMATED COUNT: 28.8 % (ref 11.7–14.2)
GLOBULIN SER CALC-MCNC: 3.7 G/DL (ref 2.2–4)
GLUCOSE SERPL-MCNC: 76 MG/DL (ref 70–99)
HCT VFR BLD AUTO: 32.1 % (ref 37–53)
HGB BLD-MCNC: 10.4 G/DL (ref 13.5–17.5)
LYMPHOCYTES # BLD: 0.39 K/MM3 (ref 0.84–5.2)
LYMPHOCYTES NFR BLD: 3 % (ref 21–46)
MAGNESIUM SERPL-MCNC: 2.9 MG/DL (ref 1.6–2.4)
MCHC RBC AUTO-ENTMCNC: 32.4 G/DL (ref 31.5–36.5)
MCV RBC AUTO: 85 FL (ref 80–100)
METAMYELOCYTES # BLD MANUAL: 0.26 K/MM3 (ref 0–0)
METAMYELOCYTES NFR BLD MANUAL: 2 % (ref 0–0)
MONOCYTES # BLD: 1.17 K/MM3 (ref 0.16–1.47)
MONOCYTES NFR BLD: 9 % (ref 4–13)
MYELOCYTES # BLD MANUAL: 0.39 K/MM3 (ref 0–0)
MYELOCYTES NFR BLD MANUAL: 3 % (ref 0–0)
NEUTS BAND NFR BLD MANUAL: 7 % (ref 0–8)
NEUTS SEG # BLD MANUAL: 10.21 K/MM3 (ref 1.96–9.15)
NEUTS SEG NFR BLD MANUAL: 71 % (ref 41–73)
NRBC # BLD AUTO: 0.07 K/MM3 (ref 0–0.02)
NRBC BLD AUTO-RTO: 0.5 /100 WBC (ref 0–0.2)
PHOSPHATE SERPL-MCNC: 5.5 MG/DL (ref 2.5–4.9)
PLATELET # BLD AUTO: 302 K/MM3 (ref 150–400)
POTASSIUM SERPL-SCNC: 4.1 MMOL/L (ref 3.5–5.5)
PROMYELOCYTES # BLD MANUAL: 0.26 K/MM3 (ref 0–0)
PROMYELOCYTES NFR BLD MANUAL: 2 % (ref 0–0)
PROT SERPL-MCNC: 5.4 G/DL (ref 6.4–8.2)
PROTHROMBIN TIME: 28.5 SEC (ref 9.7–11.5)
SODIUM SERPL-SCNC: 137 MMOL/L (ref 136–145)
TOTAL CELLS COUNTED BLD: 100

## 2022-01-26 PROCEDURE — 0DHA8UZ INSERTION OF FEEDING DEVICE INTO JEJUNUM, VIA NATURAL OR ARTIFICIAL OPENING ENDOSCOPIC: ICD-10-PCS | Performed by: INTERNAL MEDICINE

## 2022-01-26 NOTE — NUR
Patient taken off T piece and placed back on full support on ventilator so he
can be sedated for endoscopy. Purpose of endoscopy is to advance dohboff tube
into duodenum. Vent settings currently ACVC 12/450/5/30%. Propofol at 40
mcg/kg/min. Levophed turned on and currently at 5 mcg/min.

## 2022-01-26 NOTE — NUR
01/26/22 1757 Frank Noe
SEDATION MONITORED BY ICU REESE PRICE. 3-LEAD EKG REVIEWED WITH
PHYSICIAN PRIOR TO START OF PROCEDURE. History, Chart, Medications and
Allergies reviewed before start of procedure. MONITOR INTACT WITH
CONTINUOUS PULSE OXIMETRY AND INTERMITTENT BP. O2 VIA N/C INTACT
THROUGHOUT SEDATION/PROCEDURE.

## 2022-01-26 NOTE — NUR
Assumed care of pt at 0700. Bedside report received from Britni LEIGH. Pt is not
receiving sedation, however intermittently receives fentanyl for pain.
Occasionally makes eye contact and follows commands such as "open your mouth"
when oral care is being performed. On ventilator via trach spontaneous with PS
10/5 and 30% FiO2.

## 2022-01-26 NOTE — NUR
Call placed to Dr Man to notify provider of CBG 60. 1/2 amp D50 given, D10
drip started at 50 mL/hr. Plan to check CBG Q4H.

## 2022-01-26 NOTE — NUR
SHIFT SUMMARY
PATIENT OPENS EYES AND MOVES ALL EXTREMETIES SPONTANEUOSLY. PATIENT DID MAKE
SOME PURPOSEFUL EYE CONTACT AT TIMES THROUGH THE NIGHT BUT NOT CONSISTENT.
UNABLE TO FOLLOW COMMANDS. RIGHT ARM STRONGER THAN THE LEFT. DYSKINESIA LIKE
MOVEMENTS IN EXTREMETIES AND TONGUE. 02 SATS >95% ON VENT VIA TRACH, SPONT PS
10/5 FI02 30%. LARGE AMOUNT OF TAN SPUTUM SUCTIONED VIA TRACH. LUNGS CLEAR TO
DIM. HR -115. BP STABLE ON LOW DOSE OF LEVOPHED 1-3 MCG/MIN. BOWEL TONES
ACTIVE X4. RECTAL TUBE DRAINING LIQUID BROWN STOOL. KRAFT OUTPUT 15 MLS,
YELLOW/ORANGE URINE. CHANGED WET TO DRY DRESSING ON COCCYX. REPOSITIONED Q2
HOURS. MEDICATED FOR PAIN PER EMAR. MEDICATED FOR LOW BLOOD GLUCOSE PER
PROTOCOL.

## 2022-01-26 NOTE — NUR
Plan of care discussed with Dr Man and Dr Herring. Per Dr Man, hold AM
medications. Ok for pt to receive midodrine and phosphate binder. Plan for ABD
xray. Senna to be discontinued as pt continues to pass stool through rectal
tube. Per Dr Herring, continue with wet to dry dressing changes on sacrum once
per shift.

## 2022-01-26 NOTE — NUR
SUMMARY
 
Neuro: Propofol turned on for procedural sedation, it is now off. Pt
responsive to painful stimulus. Movement noted to all extremities. Cough and
gag present. Pupils 3 mm, equal and brisk reaction to light.
 
Musculoskeletal: Requires full assistance with ADLs. Repostioned Q2H.
 
Respiratory: Lungs clear t/o, diminished in bases. On trach collar of majority
of day. Placed back on ventilator for endoscopy. 8.0 shiley trach, cuffed,
nonfenestrated. Vent settings ACVC 12/450/5/30%. SpO2 90% or greater. Large
amount of secretions suctioned this morning but not as much later in day.
 
Cardiac: Atrial flutter, rate ranging from . Levophed restarted when
propofol restarted. Currently at 2 mcg/min. Receeding edema.
 
GI: Dobhoff advanced to duodenum by endoscopy team. Flushes without
difficulty. Secured to right nare at 78 cm marking. TF restarted per orders.
Rectal tube remains in place. Only 25 mL output this shift.
 
: 25 mL urine output this shift, bright orange.
 
Skin: Unchanged from initial assessment. Sacrum dressing changed. Large amount
of serosanguinous drainage.
 
Psychosocial: Family updated by Dr Man. MELANI pt's psychosocial status.

## 2022-01-26 NOTE — NUR
ASSUMED CARE AT 1900
PT LAYING IN BED ON VENT VIA TRACH WITH VENT SETTINGS AC/VC 12/450/5/30%. PT
OPENES EYES TO VERBAL STIMULI BUT WAS UNABLE TO ANSWER Y/N QUESTIONS OR FOLLOW
DIRECTIONS; HE MADE EYE CONTACT AND ATTEMPTED TO FOLLOW DIRECTIONS; NO
SEDATION AT THIS TIME; INVOLUNTARY MOVEMENT NOTED. TEMP 99.1. -110. SBP
100'S; MAP >60; LEVOPHED INFUSING AT 2MCG/MIN. DOBHOFF IN PLACE WITH TWOCAL HN
INFUSING AT 20ML/HR WITH 30ML WATER FLUSHES Q4HR; TOLERATING TF, NO BELCHING.
RECTAL TUBE AND KRAFT IN PLACE DRAINING TO GRAVITY. SEE SHIFT ASSESSMENT FOR
FULL ASSESSMENT.

## 2022-01-27 LAB
ALBUMIN SERPL BCP-MCNC: 1.6 G/DL (ref 3.4–5)
ALBUMIN/GLOB SERPL: 0.4 {RATIO} (ref 0.8–1.8)
ALT SERPL W P-5'-P-CCNC: 58 U/L (ref 12–78)
ANION GAP SERPL CALCULATED.4IONS-SCNC: 12 MMOL/L (ref 6–16)
ANION GAP SERPL CALCULATED.4IONS-SCNC: 15 MMOL/L (ref 6–16)
AST SERPL W P-5'-P-CCNC: 141 U/L (ref 12–37)
BASOPHILS # BLD: 0.15 K/MM3 (ref 0–0.23)
BASOPHILS # BLD: 0.16 K/MM3 (ref 0–0.23)
BASOPHILS NFR BLD: 1 % (ref 0–2)
BASOPHILS NFR BLD: 1 % (ref 0–2)
BILIRUB DIRECT SERPL-MCNC: 19.5 MG/DL (ref 0–0.3)
BILIRUB INDIRECT SERPL-MCNC: 3.5 MG/DL (ref 0.1–0.7)
BILIRUB SERPL-MCNC: 23 MG/DL (ref 0.1–1)
BUN SERPL-MCNC: 123 MG/DL (ref 8–24)
BUN SERPL-MCNC: 93 MG/DL (ref 8–24)
CALCIUM SERPL-MCNC: 11.2 MG/DL (ref 8.5–10.1)
CALCIUM SERPL-MCNC: 11.3 MG/DL (ref 8.5–10.1)
CHLORIDE SERPL-SCNC: 102 MMOL/L (ref 98–108)
CHLORIDE SERPL-SCNC: 98 MMOL/L (ref 98–108)
CO2 SERPL-SCNC: 23 MMOL/L (ref 21–32)
CO2 SERPL-SCNC: 26 MMOL/L (ref 21–32)
CREAT SERPL-MCNC: 5.94 MG/DL (ref 0.6–1.2)
CREAT SERPL-MCNC: 7.69 MG/DL (ref 0.6–1.2)
DEPRECATED RDW RBC AUTO: 83 FL (ref 35.1–46.3)
DEPRECATED RDW RBC AUTO: 84.1 FL (ref 35.1–46.3)
EOSINOPHIL # BLD: 0.64 K/MM3 (ref 0–0.68)
EOSINOPHIL # BLD: 0.94 K/MM3 (ref 0–0.68)
EOSINOPHIL NFR BLD: 4 % (ref 0–6)
EOSINOPHIL NFR BLD: 6 % (ref 0–6)
ERYTHROCYTE [DISTWIDTH] IN BLOOD BY AUTOMATED COUNT: 28.5 % (ref 11.7–14.2)
ERYTHROCYTE [DISTWIDTH] IN BLOOD BY AUTOMATED COUNT: 28.8 % (ref 11.7–14.2)
GLOBULIN SER CALC-MCNC: 3.6 G/DL (ref 2.2–4)
GLUCOSE SERPL-MCNC: 108 MG/DL (ref 70–99)
GLUCOSE SERPL-MCNC: 139 MG/DL (ref 70–99)
HCT VFR BLD AUTO: 31.5 % (ref 37–53)
HCT VFR BLD AUTO: 31.8 % (ref 37–53)
HGB BLD-MCNC: 10 G/DL (ref 13.5–17.5)
HGB BLD-MCNC: 10.3 G/DL (ref 13.5–17.5)
LYMPHOCYTES # BLD: 0.63 K/MM3 (ref 0.84–5.2)
LYMPHOCYTES # BLD: 0.81 K/MM3 (ref 0.84–5.2)
LYMPHOCYTES NFR BLD: 4 % (ref 21–46)
LYMPHOCYTES NFR BLD: 5 % (ref 21–46)
MAGNESIUM SERPL-MCNC: 2.6 MG/DL (ref 1.6–2.4)
MAGNESIUM SERPL-MCNC: 3 MG/DL (ref 1.6–2.4)
MCHC RBC AUTO-ENTMCNC: 31.7 G/DL (ref 31.5–36.5)
MCHC RBC AUTO-ENTMCNC: 32.4 G/DL (ref 31.5–36.5)
MCV RBC AUTO: 87 FL (ref 80–100)
MCV RBC AUTO: 88 FL (ref 80–100)
METAMYELOCYTES # BLD MANUAL: 1.62 K/MM3 (ref 0–0)
METAMYELOCYTES NFR BLD MANUAL: 10 % (ref 0–0)
MONOCYTES # BLD: 1.58 K/MM3 (ref 0.16–1.47)
MONOCYTES # BLD: 1.94 K/MM3 (ref 0.16–1.47)
MONOCYTES NFR BLD: 10 % (ref 4–13)
MONOCYTES NFR BLD: 12 % (ref 4–13)
MYELOCYTES # BLD MANUAL: 0.63 K/MM3 (ref 0–0)
MYELOCYTES # BLD MANUAL: 1.78 K/MM3 (ref 0–0)
MYELOCYTES NFR BLD MANUAL: 11 % (ref 0–0)
MYELOCYTES NFR BLD MANUAL: 4 % (ref 0–0)
NEUTS BAND NFR BLD MANUAL: 4 % (ref 0–8)
NEUTS BAND NFR BLD MANUAL: 7 % (ref 0–8)
NEUTS SEG # BLD MANUAL: 11.87 K/MM3 (ref 1.96–9.15)
NEUTS SEG # BLD MANUAL: 9.23 K/MM3 (ref 1.96–9.15)
NEUTS SEG NFR BLD MANUAL: 50 % (ref 41–73)
NEUTS SEG NFR BLD MANUAL: 71 % (ref 41–73)
NRBC # BLD AUTO: 0.08 K/MM3 (ref 0–0.02)
NRBC # BLD AUTO: 0.13 K/MM3 (ref 0–0.02)
NRBC BLD AUTO-RTO: 0.5 /100 WBC (ref 0–0.2)
NRBC BLD AUTO-RTO: 0.8 /100 WBC (ref 0–0.2)
PHOSPHATE SERPL-MCNC: 4.9 MG/DL (ref 2.5–4.9)
PHOSPHATE SERPL-MCNC: 6.5 MG/DL (ref 2.5–4.9)
PLATELET # BLD AUTO: 260 K/MM3 (ref 150–400)
PLATELET # BLD AUTO: 278 K/MM3 (ref 150–400)
POTASSIUM SERPL-SCNC: 3.6 MMOL/L (ref 3.5–5.5)
POTASSIUM SERPL-SCNC: 4.1 MMOL/L (ref 3.5–5.5)
PROT SERPL-MCNC: 5.2 G/DL (ref 6.4–8.2)
PROTHROMBIN TIME: 20.7 SEC (ref 9.7–11.5)
SODIUM SERPL-SCNC: 136 MMOL/L (ref 136–145)
SODIUM SERPL-SCNC: 140 MMOL/L (ref 136–145)
TOTAL CELLS COUNTED BLD: 100
TOTAL CELLS COUNTED BLD: 100
TRIGL SERPL-MCNC: 287 MG/DL (ref 30–160)

## 2022-01-27 NOTE — NUR
ASSUMED CARE AT 1900
PT LAYING IN BED ON VENT VIA TRACH WITH VENT SETTINGS SPONT 10/5, FIO2 30%;
MODERATE AMOUNT OF THIC YELLOW SECREATIONS FROM TRACH. PT REACTIVE TO NOXIOUS
AND PAINFUL STIMULI; OCCATIONALLY OPENS EYES SPONTANIOUSLY; DYSKINESIA NOTED
AND IS MORE ACTIVE WHEN STIMULATED. TEMP 100.4; FAN IN PLACE. HR 80'S; HR
DECREASED FROM 100 TO 63 DURING ORAL CARE AND STEADILY CAME UP TO 80'S. SBP
110' LEVOPHED INFUSING AT 2MCG/MIN. TwoCal TF INFUSING VIA DOBHOFF AT 50ML/HR
(GOAL) WITH 30ML WATER FLUSHES Q4HR. RECTAL TUBE AND KRAFT IN PLACE AND
DRAINING TO GRAVITY. SEE SHIFT ASSESSMENT FOR FULL ASSESSMENT.

## 2022-01-27 NOTE — NUR
PT ON PRESSURE SUPPORT 10/5 30% FiO2. TEMP 100.1 F. LABILE RESPONSIVENESS FROM
PT BUT WILL NOD HEAD APPROPRIATELY. DYSKINESIA STILL PRESENT. DEPENDENT EDEMA
IN EXTREMETIES AND JAUNDICED.

## 2022-01-27 NOTE — NUR
Spoke to pt's daughter Rose by phone today. We discussed pt's course over
the past 30 days. Rose is aware the patient's health is worsening. She
states, "He wouldn't want to live like this". We decided to talk more
tomorrow, as I received permission from Elizabeth, charge nurse for Rose and
brother to come see pt tomorrow prior deciding on any changes to care.

## 2022-01-27 NOTE — NUR
END OF SHIFT SUMMARY
NO ACUTE EVENTS OVER NIGHT. PT CONT TO BE ON THE VENT VIA TRACH WITH VENT
SETTINGS AC/VC 12/450/5/30%; CONT TO HAVE THICK YELLOW SECREATIONS FROM TRACH
AND CLEAR ORAL SECREATIONS. PT ABLE TO OPEN EYES AND MAKE EYE CONTACT BUT DID
NOT FOLLOW DIRECTIONS. MAX TEMP 99.9; FAN ON PT AND TEMP CAME DOWN. HR .
SBP ; LEVOPHED ON SB SINCE 2230. TWOCAL TF INCREASED TO 30ML/HR AT 0500.
RECTAL TUBE IN PLACE WITH 50ML OUTPUT. KRAFT IN PLACE WITH 33ML OUTPUT. WILL
REPORT TO AM RN WHEN AVAILABLE.

## 2022-01-27 NOTE — NUR
ASSUMED CARE OF PT AT 0705. PT STABLE ON CURRENT INTERVENTION. PT IS ON
PRESSURE SUPPORT 5/5 30% FiO2. NO DRIPS CURRENTLY RUNNING.

## 2022-01-27 NOTE — NUR
PT HAD APNEIC EPISODE ON TRACH COLLAR,
DESAT TO 58%, PT BAGGED UNIT RT PLACED PT BACK ON
PRESSURE SUPPORT 5/5 30%. PT QUICKLY STABILIZED, SATS RETURNED TO 99%. DR. WHITE AT BEDSIDE TO SEE
PT.

## 2022-01-28 LAB
ALBUMIN SERPL BCP-MCNC: 1.7 G/DL (ref 3.4–5)
ALBUMIN/GLOB SERPL: 0.4 {RATIO} (ref 0.8–1.8)
ALT SERPL W P-5'-P-CCNC: 61 U/L (ref 12–78)
ANION GAP SERPL CALCULATED.4IONS-SCNC: 14 MMOL/L (ref 6–16)
AST SERPL W P-5'-P-CCNC: 143 U/L (ref 12–37)
BASOPHILS # BLD: 0.15 K/MM3 (ref 0–0.23)
BASOPHILS NFR BLD: 1 % (ref 0–2)
BILIRUB SERPL-MCNC: 24.8 MG/DL (ref 0.1–1)
BUN SERPL-MCNC: 100 MG/DL (ref 8–24)
CALCIUM SERPL-MCNC: 11.6 MG/DL (ref 8.5–10.1)
CHLORIDE SERPL-SCNC: 101 MMOL/L (ref 98–108)
CO2 SERPL-SCNC: 24 MMOL/L (ref 21–32)
CREAT SERPL-MCNC: 6.21 MG/DL (ref 0.6–1.2)
DEPRECATED RDW RBC AUTO: 84.1 FL (ref 35.1–46.3)
EOSINOPHIL # BLD: 0 K/MM3 (ref 0–0.68)
EOSINOPHIL NFR BLD: 0 % (ref 0–6)
ERYTHROCYTE [DISTWIDTH] IN BLOOD BY AUTOMATED COUNT: 28.6 % (ref 11.7–14.2)
GLOBULIN SER CALC-MCNC: 4.1 G/DL (ref 2.2–4)
GLUCOSE SERPL-MCNC: 129 MG/DL (ref 70–99)
HCT VFR BLD AUTO: 34.3 % (ref 37–53)
HGB BLD-MCNC: 11 G/DL (ref 13.5–17.5)
LYMPHOCYTES # BLD: 1.87 K/MM3 (ref 0.84–5.2)
LYMPHOCYTES NFR BLD: 11 % (ref 21–46)
MAGNESIUM SERPL-MCNC: 3 MG/DL (ref 1.6–2.4)
MCHC RBC AUTO-ENTMCNC: 32.1 G/DL (ref 31.5–36.5)
MCV RBC AUTO: 87 FL (ref 80–100)
METAMYELOCYTES # BLD MANUAL: 0.31 K/MM3 (ref 0–0)
METAMYELOCYTES NFR BLD MANUAL: 2 % (ref 0–0)
MONOCYTES # BLD: 0.93 K/MM3 (ref 0.16–1.47)
MONOCYTES NFR BLD: 6 % (ref 4–13)
MYELOCYTES # BLD MANUAL: 0.46 K/MM3 (ref 0–0)
MYELOCYTES NFR BLD MANUAL: 3 % (ref 0–0)
NEUTS BAND NFR BLD MANUAL: 3 % (ref 0–8)
NEUTS SEG # BLD MANUAL: 11.87 K/MM3 (ref 1.96–9.15)
NEUTS SEG NFR BLD MANUAL: 73 % (ref 41–73)
NRBC # BLD AUTO: 0.04 K/MM3 (ref 0–0.02)
NRBC BLD AUTO-RTO: 0.3 /100 WBC (ref 0–0.2)
PHOSPHATE SERPL-MCNC: 5.7 MG/DL (ref 2.5–4.9)
PLATELET # BLD AUTO: 275 K/MM3 (ref 150–400)
POTASSIUM SERPL-SCNC: 4.1 MMOL/L (ref 3.5–5.5)
PROT SERPL-MCNC: 5.8 G/DL (ref 6.4–8.2)
PROTHROMBIN TIME: 14.8 SEC (ref 9.7–11.5)
SODIUM SERPL-SCNC: 139 MMOL/L (ref 136–145)
TOTAL CELLS COUNTED BLD: 100
VARIANT LYMPHS NFR BLD MANUAL: 1 % (ref 0–0)

## 2022-01-28 NOTE — NUR
CARE ASSUMED FOR JAIME, MULTIPLE FAMILY MEMBERS AT THE BEDSIDE, HE RAISES HIS
EYEBROWS AND TWITCHES HIS HEAD TO MY VOICE. OG TO LIS, TRACH COLLAR TO
HUMIDIFIED AIR. NO PURPOSEFUL MOVEMENT OR FOLLOWING OF COMMANDS. FAMILY SAYS
HE HASN'T OPENED HIS EYES FOR THEM. RECTAL TUBE AND KRAFT TO GRAVITY WITH NO
OUTPUT. SKIN YELLOW, EYES YELLOW, OPENED AND GAZE IS UPWARD. CONTINUE TO CARE
FOR PATIENT, FAMILY.

## 2022-01-28 NOTE — NUR
PT DID NOT RESPOND MEANINGFULY TO VERBAL STIMULI. MOVEMENTS WERE PURPOSFUL AT
TIMES BUT NOT TO COMMANDS. PT IS ALSO NO LONGER TRACKING WITH EYES. DYSKINESIA
STILL PRESENT. TF HAS BEEN OFF SINCE NOON DUE TO DOBHOFF END MIGRATING INTO
PT'S STOMACH AND COILING IN BACK OF PT'S THROAT. AIR WAS SEEN IN PT'S STOMACH
SO AN OG WAS PLACED FOR DECOMPRESSION. YELLOW TRANSLUCENT DRAINAGE FROM OG.PT
HAD 1000 OF TAN DRAINAGE INTO FMS AND ONLY 30ML OF YELLOW/ORANGE URINE INTO
KRAFT. FAMILY CAME FOR MEETING WITH PALLIATIVE NURSE AND THE DECISION WAS MADE
TO CHANEG PT TO COMFORT CARE. PT WAS THEN PLACED ON HEATED TRACH COLLAR AT 26%
FiO2. SCOPALAMINE PATCH PLACED ON PT. WOUND CARE FOR SACRUM AND EAR WAS
COMPLETED EARLIER IN THE DAY WITH NO IMPROVEMENT. VISITORS ARE CURRENTLY AT
BEDSIDE WITH PT. COMFORT MEDS HAVE BEEN INITIATED AND PT IS RESTING
COMFORTABLE AT THE MOMENT.

## 2022-01-28 NOTE — NUR
took over care of pt at 0700 1/28/22. pt is resting while on ventilator, set
on pressure support 10/5 fIo2 30%, and 2 of levo. one incident of bradycardia
reported overnight per night shift RN at the beginning of her shift.

## 2022-01-28 NOTE — NUR
JAIME CONTINUES WITH NO RESPONSE TO VERBAL OR PHYSICAL STIMULI, DAUGHTER
LUIS AND SON ROLAND IN THE ROOM WITH HIM. TRACH HAS HUMIDIFIED OXYGEN,
TEMP NORMAL. HEART RATE NSR. BREATHING EVEN AND UNLABORED.

## 2022-01-28 NOTE — NUR
SON CAMERON CALLED FOR ASSISTANCE, JAIME WAS COUGHING. MEDICATED PER MAR FOR
COMFORT. HE IS NOW RESTING QUIETLY.

## 2022-01-28 NOTE — NUR
END OF SHIFT SUMMARY
NO ACUTE EVENTS OVER NIGHT. PT CONT TO BE ON VENT VIA TRACH WITH VENT SETTINGS
SPONT 10/5, FIO2 30%, Vt 500'S; MODERATE AMOUNT OF THICK SECREATIONS FROM
TRACK. PT IS MORE ALERT THAN PREVIOUSLY, OPENING EYES MORE FREQUENTLY BUT DOES
NOT ANSWER Y/N QUESTIONS OR FOLLOWS DIRECTIONS. MAX TEMP 100.4; FAN ON PT AND
HELPFUL. HR ; ONCE EPISODE OF REYNALDO DOWN TO 60 FROM 100. -130;
LEVOPHED INFUSING AT 2MCG/MIN. TWOCAL TF INFUSING AT GOAL. RECTAL TUBE IN
PLACE WITH 1000ML OUTPUT; RECTAL BAG HAD TO BE BURPED TWICE. KRAFT IN PLACE
WITH 45ML OUTPUT. SACRAL DRESSING CHANGED. WILL REPORT TO AM RN WHEN
AVAILABLE.
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
## 2022-01-28 NOTE — NUR
Pt was dry heaving and belching. Zofran given, but this did not alleviate
symptoms and pt continued to dry heave. Held tube feed and per tube meds. Dr Solorzano aware. Suctioning given to oropharynx, pt then vomited. On inspection
of mouth, doboff was coiled. Dr Solorzano notified. Provider adjusted dobhoff at
nare to remove coil at oropharynx and dobhoff resecured. XR obtained to locate
end of dobhoff. Plan to hold tube feeds indefinately. Pt continued to dry
heave and burp, OG tube placed using lubricant with lidocaine. Placed to low
intermittent suction. Burping and dry heaving resolved. OG tube chosen instead
of NG tube as dobhoff is present to right nare and left nare had bleeding
problems this hospitalization and previously required rhino rocket.

## 2022-01-28 NOTE — NUR
Pt's son and daughter have elected to place patient on comfort care. They are
present at bedside, and will continue to offer emotional support as needed.
Charge Nurse notified, and bedside RN along with RT are in the room with
family now. I will remain available.

## 2022-01-28 NOTE — NUR
JAIME WAS TURNED AND REPOSITIONED, LUIS AND ROLAND REMAIN BY HIS SIDE,
SHOWING STAFF PICTURES OF JAIME PRIOR TO ILLNESS. OGT WITH RED RETURN WITH ANY
MOVEMENT. KRAFT AND RECTAL TUBE CONTINUE WITHOUT ANY DRAINAGE. PT WITHOUT ANY
RESPONSES. CHILDREN HAVE DECIDED TO LEAVE FOR THE NIGHT. WILL CALL FOR ANY
CHANGES.

## 2022-01-29 NOTE — NUR
NO CHANGES
 
TOOK OVER CARE AT 0720, FAMILY UPDATED VIA PHONE, STATE THEY ARE ON THEIR WAY.
PT HAS BEEN GIVEN COMFORT MEDS TO HELP WITH AIR HUNGER.

## 2022-01-29 NOTE — NUR
PT PASSED AWAY....
 
THE PT PASSED AWAY AT 1330, [employer] WAS CALLED AND THE PT IS NOT A
CANDIDATE FOR DONATION. FAMILY IN THE ROOM WHEN THE PT PASSED AWAY, THE PT WAS
PRONNOUNCED BY THIS RN.
 
CHARGE NURSE AND NURING SUPERVISOR NOTIFIED.

## 2022-01-29 NOTE — NUR
NO CHANGES NOTED IN JAIME, HIS SATS HOVER BETWEEN 62-72, HEART RATE 80-90S.
EYES STILL DYSCONJUGATE, PUPILS NOT REACTING. KRAFT WITH 50ML OUT AND RECTAL
TUBE WITH MINIMAL RETURN. TRACH COLLAR ON HUMIDIFIED AIR. OGT WITH RED TINGED
RETURN.

## 2022-01-29 NOTE — NUR
PT TURNED AND REPOSITIONED, ORAL CARE AND TRACH CARE DONE. PT WITH RESPONSIVE
CLOSING OF THE MOUTH WHEN SWAB IS PUT IN. OTHERWISE, NO CHANGES.

## 2022-01-29 NOTE — NUR
JAIME HAS A DISCONJUGATE GAZE AND PUPILS UNREACTIVE. HE STILL CLOSES HIS MOUTH
AROUND THE MOUTH SWAB. CONTINUE TO CARE AS NEEDED.
